# Patient Record
Sex: MALE | Race: WHITE | Employment: OTHER | URBAN - METROPOLITAN AREA
[De-identification: names, ages, dates, MRNs, and addresses within clinical notes are randomized per-mention and may not be internally consistent; named-entity substitution may affect disease eponyms.]

---

## 2017-01-11 ENCOUNTER — TELEPHONE (OUTPATIENT)
Dept: CARDIOTHORACIC SURGERY | Age: 82
End: 2017-01-11

## 2017-01-18 ENCOUNTER — CARE COORDINATION (OUTPATIENT)
Dept: FAMILY MEDICINE CLINIC | Age: 82
End: 2017-01-18

## 2017-01-18 PROBLEM — D69.6 THROMBOCYTOPENIA (HCC): Status: ACTIVE | Noted: 2017-01-18

## 2017-02-03 ENCOUNTER — OFFICE VISIT (OUTPATIENT)
Dept: FAMILY MEDICINE CLINIC | Age: 82
End: 2017-02-03

## 2017-02-03 VITALS
HEART RATE: 55 BPM | OXYGEN SATURATION: 90 % | DIASTOLIC BLOOD PRESSURE: 80 MMHG | WEIGHT: 220 LBS | RESPIRATION RATE: 16 BRPM | BODY MASS INDEX: 35.36 KG/M2 | SYSTOLIC BLOOD PRESSURE: 130 MMHG | HEIGHT: 66 IN

## 2017-02-03 DIAGNOSIS — E11.22 TYPE 2 DIABETES MELLITUS WITH STAGE 3 CHRONIC KIDNEY DISEASE, UNSPECIFIED LONG TERM INSULIN USE STATUS: Primary | ICD-10-CM

## 2017-02-03 DIAGNOSIS — E08.21 DIABETES MELLITUS DUE TO UNDERLYING CONDITION WITH DIABETIC NEPHROPATHY, WITH LONG-TERM CURRENT USE OF INSULIN (HCC): ICD-10-CM

## 2017-02-03 DIAGNOSIS — I73.9 PVD (PERIPHERAL VASCULAR DISEASE) (HCC): ICD-10-CM

## 2017-02-03 DIAGNOSIS — Z79.4 DIABETES MELLITUS DUE TO UNDERLYING CONDITION WITH DIABETIC NEPHROPATHY, WITH LONG-TERM CURRENT USE OF INSULIN (HCC): ICD-10-CM

## 2017-02-03 DIAGNOSIS — I10 ESSENTIAL HYPERTENSION: Chronic | ICD-10-CM

## 2017-02-03 DIAGNOSIS — N18.3 TYPE 2 DIABETES MELLITUS WITH STAGE 3 CHRONIC KIDNEY DISEASE, UNSPECIFIED LONG TERM INSULIN USE STATUS: Primary | ICD-10-CM

## 2017-02-03 LAB — HBA1C MFR BLD: 6.2 %

## 2017-02-03 PROCEDURE — G8419 CALC BMI OUT NRM PARAM NOF/U: HCPCS | Performed by: FAMILY MEDICINE

## 2017-02-03 PROCEDURE — G8427 DOCREV CUR MEDS BY ELIG CLIN: HCPCS | Performed by: FAMILY MEDICINE

## 2017-02-03 PROCEDURE — 1123F ACP DISCUSS/DSCN MKR DOCD: CPT | Performed by: FAMILY MEDICINE

## 2017-02-03 PROCEDURE — G8598 ASA/ANTIPLAT THER USED: HCPCS | Performed by: FAMILY MEDICINE

## 2017-02-03 PROCEDURE — G8484 FLU IMMUNIZE NO ADMIN: HCPCS | Performed by: FAMILY MEDICINE

## 2017-02-03 PROCEDURE — 83036 HEMOGLOBIN GLYCOSYLATED A1C: CPT | Performed by: FAMILY MEDICINE

## 2017-02-03 PROCEDURE — 1036F TOBACCO NON-USER: CPT | Performed by: FAMILY MEDICINE

## 2017-02-03 PROCEDURE — 4040F PNEUMOC VAC/ADMIN/RCVD: CPT | Performed by: FAMILY MEDICINE

## 2017-02-03 PROCEDURE — 99213 OFFICE O/P EST LOW 20 MIN: CPT | Performed by: FAMILY MEDICINE

## 2017-02-17 ENCOUNTER — OFFICE VISIT (OUTPATIENT)
Dept: CARDIOLOGY CLINIC | Age: 82
End: 2017-02-17

## 2017-02-17 VITALS
DIASTOLIC BLOOD PRESSURE: 70 MMHG | SYSTOLIC BLOOD PRESSURE: 126 MMHG | WEIGHT: 219 LBS | HEIGHT: 67 IN | HEART RATE: 60 BPM | BODY MASS INDEX: 34.37 KG/M2

## 2017-02-17 DIAGNOSIS — I10 ESSENTIAL HYPERTENSION: ICD-10-CM

## 2017-02-17 DIAGNOSIS — I35.0 NONRHEUMATIC AORTIC VALVE STENOSIS: ICD-10-CM

## 2017-02-17 DIAGNOSIS — E78.00 HYPERCHOLESTEREMIA: ICD-10-CM

## 2017-02-17 DIAGNOSIS — Z95.2 S/P AVR (AORTIC VALVE REPLACEMENT): Primary | Chronic | ICD-10-CM

## 2017-02-17 DIAGNOSIS — I73.9 PVD (PERIPHERAL VASCULAR DISEASE) (HCC): ICD-10-CM

## 2017-02-17 PROCEDURE — G8484 FLU IMMUNIZE NO ADMIN: HCPCS | Performed by: INTERNAL MEDICINE

## 2017-02-17 PROCEDURE — 1123F ACP DISCUSS/DSCN MKR DOCD: CPT | Performed by: INTERNAL MEDICINE

## 2017-02-17 PROCEDURE — G8598 ASA/ANTIPLAT THER USED: HCPCS | Performed by: INTERNAL MEDICINE

## 2017-02-17 PROCEDURE — G8417 CALC BMI ABV UP PARAM F/U: HCPCS | Performed by: INTERNAL MEDICINE

## 2017-02-17 PROCEDURE — 4040F PNEUMOC VAC/ADMIN/RCVD: CPT | Performed by: INTERNAL MEDICINE

## 2017-02-17 PROCEDURE — 1036F TOBACCO NON-USER: CPT | Performed by: INTERNAL MEDICINE

## 2017-02-17 PROCEDURE — 99214 OFFICE O/P EST MOD 30 MIN: CPT | Performed by: INTERNAL MEDICINE

## 2017-02-17 PROCEDURE — G8427 DOCREV CUR MEDS BY ELIG CLIN: HCPCS | Performed by: INTERNAL MEDICINE

## 2017-04-12 DIAGNOSIS — J44.1 COPD EXACERBATION (HCC): ICD-10-CM

## 2017-04-12 RX ORDER — ALBUTEROL SULFATE 2.5 MG/3ML
2.5 SOLUTION RESPIRATORY (INHALATION) EVERY 6 HOURS PRN
Qty: 120 EACH | Refills: 10 | Status: SHIPPED | OUTPATIENT
Start: 2017-04-12 | End: 2018-01-03

## 2017-04-18 ENCOUNTER — OFFICE VISIT (OUTPATIENT)
Dept: FAMILY MEDICINE CLINIC | Age: 82
End: 2017-04-18

## 2017-04-18 VITALS
DIASTOLIC BLOOD PRESSURE: 70 MMHG | OXYGEN SATURATION: 88 % | HEIGHT: 66 IN | SYSTOLIC BLOOD PRESSURE: 120 MMHG | WEIGHT: 231 LBS | RESPIRATION RATE: 14 BRPM | BODY MASS INDEX: 37.12 KG/M2 | HEART RATE: 71 BPM

## 2017-04-18 DIAGNOSIS — D50.9 IRON DEFICIENCY ANEMIA, UNSPECIFIED IRON DEFICIENCY ANEMIA TYPE: ICD-10-CM

## 2017-04-18 DIAGNOSIS — N18.3 CKD (CHRONIC KIDNEY DISEASE), STAGE 3 (MODERATE): ICD-10-CM

## 2017-04-18 DIAGNOSIS — R18.8 OTHER ASCITES: ICD-10-CM

## 2017-04-18 DIAGNOSIS — R06.02 SOB (SHORTNESS OF BREATH): Primary | ICD-10-CM

## 2017-04-18 DIAGNOSIS — R60.0 PEDAL EDEMA: ICD-10-CM

## 2017-04-18 DIAGNOSIS — R53.1 WEAKNESS: ICD-10-CM

## 2017-04-18 PROCEDURE — G8598 ASA/ANTIPLAT THER USED: HCPCS | Performed by: FAMILY MEDICINE

## 2017-04-18 PROCEDURE — G8417 CALC BMI ABV UP PARAM F/U: HCPCS | Performed by: FAMILY MEDICINE

## 2017-04-18 PROCEDURE — 1123F ACP DISCUSS/DSCN MKR DOCD: CPT | Performed by: FAMILY MEDICINE

## 2017-04-18 PROCEDURE — 4040F PNEUMOC VAC/ADMIN/RCVD: CPT | Performed by: FAMILY MEDICINE

## 2017-04-18 PROCEDURE — 99214 OFFICE O/P EST MOD 30 MIN: CPT | Performed by: FAMILY MEDICINE

## 2017-04-18 PROCEDURE — G8427 DOCREV CUR MEDS BY ELIG CLIN: HCPCS | Performed by: FAMILY MEDICINE

## 2017-04-18 PROCEDURE — 1036F TOBACCO NON-USER: CPT | Performed by: FAMILY MEDICINE

## 2017-04-18 ASSESSMENT — ENCOUNTER SYMPTOMS
CHOKING: 0
SHORTNESS OF BREATH: 1
WHEEZING: 0
ABDOMINAL DISTENTION: 1
CHEST TIGHTNESS: 0
COUGH: 0

## 2017-04-22 ENCOUNTER — CARE COORDINATION (OUTPATIENT)
Dept: CASE MANAGEMENT | Age: 82
End: 2017-04-22

## 2017-04-25 ENCOUNTER — OFFICE VISIT (OUTPATIENT)
Dept: FAMILY MEDICINE CLINIC | Age: 82
End: 2017-04-25

## 2017-04-25 VITALS
WEIGHT: 214.6 LBS | SYSTOLIC BLOOD PRESSURE: 140 MMHG | OXYGEN SATURATION: 93 % | BODY MASS INDEX: 34.49 KG/M2 | DIASTOLIC BLOOD PRESSURE: 70 MMHG | HEIGHT: 66 IN | RESPIRATION RATE: 15 BRPM | HEART RATE: 71 BPM

## 2017-04-25 DIAGNOSIS — N18.30 CKD (CHRONIC KIDNEY DISEASE) STAGE 3, GFR 30-59 ML/MIN (HCC): ICD-10-CM

## 2017-04-25 DIAGNOSIS — I44.1 MOBITZ TYPE 2 SECOND DEGREE ATRIOVENTRICULAR BLOCK: ICD-10-CM

## 2017-04-25 DIAGNOSIS — I44.2 THIRD DEGREE HEART BLOCK (HCC): ICD-10-CM

## 2017-04-25 DIAGNOSIS — N18.4 CHRONIC KIDNEY DISEASE (CKD), STAGE IV (SEVERE) (HCC): ICD-10-CM

## 2017-04-25 DIAGNOSIS — N18.3 TYPE 2 DIABETES MELLITUS WITH STAGE 3 CHRONIC KIDNEY DISEASE, UNSPECIFIED LONG TERM INSULIN USE STATUS: ICD-10-CM

## 2017-04-25 DIAGNOSIS — E11.22 TYPE 2 DIABETES MELLITUS WITH STAGE 3 CHRONIC KIDNEY DISEASE, UNSPECIFIED LONG TERM INSULIN USE STATUS: ICD-10-CM

## 2017-04-25 DIAGNOSIS — I95.9 HYPOTENSION, UNSPECIFIED HYPOTENSION TYPE: ICD-10-CM

## 2017-04-25 DIAGNOSIS — I50.33 ACUTE ON CHRONIC DIASTOLIC HEART FAILURE (HCC): ICD-10-CM

## 2017-04-25 DIAGNOSIS — I50.32 HEART FAILURE, DIASTOLIC, CHRONIC (HCC): ICD-10-CM

## 2017-04-25 DIAGNOSIS — E86.0 DEHYDRATION: ICD-10-CM

## 2017-04-25 DIAGNOSIS — Z09 HOSPITAL DISCHARGE FOLLOW-UP: Primary | ICD-10-CM

## 2017-04-26 ENCOUNTER — TELEPHONE (OUTPATIENT)
Dept: SLEEP MEDICINE | Age: 82
End: 2017-04-26

## 2017-04-26 ENCOUNTER — EPISODE CHANGES (OUTPATIENT)
Dept: CASE MANAGEMENT | Age: 82
End: 2017-04-26

## 2017-04-27 ENCOUNTER — OFFICE VISIT (OUTPATIENT)
Dept: CARDIOLOGY CLINIC | Age: 82
End: 2017-04-27

## 2017-04-27 ENCOUNTER — TELEPHONE (OUTPATIENT)
Dept: OTHER | Age: 82
End: 2017-04-27

## 2017-04-27 ENCOUNTER — TELEPHONE (OUTPATIENT)
Dept: CARDIOLOGY CLINIC | Age: 82
End: 2017-04-27

## 2017-04-27 DIAGNOSIS — Z95.0 PACEMAKER: Primary | ICD-10-CM

## 2017-04-27 PROCEDURE — 1036F TOBACCO NON-USER: CPT | Performed by: NURSE PRACTITIONER

## 2017-04-27 PROCEDURE — 99024 POSTOP FOLLOW-UP VISIT: CPT | Performed by: NURSE PRACTITIONER

## 2017-04-28 ENCOUNTER — HOSPITAL ENCOUNTER (OUTPATIENT)
Dept: NON INVASIVE DIAGNOSTICS | Age: 82
Discharge: HOME | End: 2017-04-28
Attending: INTERNAL MEDICINE | Admitting: INTERNAL MEDICINE

## 2017-04-29 LAB
ALBUMIN SERPL-MCNC: 3.6 G/DL (ref 3.4–5)
ANION GAP SERPL CALCULATED.3IONS-SCNC: 15 MMOL/L (ref 3–16)
BUN BLDV-MCNC: 36 MG/DL (ref 7–20)
CALCIUM SERPL-MCNC: 9.4 MG/DL (ref 8.3–10.6)
CHLORIDE BLD-SCNC: 94 MMOL/L (ref 99–110)
CO2: 28 MMOL/L (ref 21–32)
CREAT SERPL-MCNC: 2 MG/DL (ref 0.8–1.3)
CREATININE URINE: 93.6 MG/DL (ref 39–259)
GFR AFRICAN AMERICAN: 39
GFR NON-AFRICAN AMERICAN: 32
GLUCOSE BLD-MCNC: 107 MG/DL (ref 70–99)
HCT VFR BLD CALC: 28.9 % (ref 40.5–52.5)
HEMOGLOBIN: 9.4 G/DL (ref 13.5–17.5)
MCH RBC QN AUTO: 35.1 PG (ref 26–34)
MCHC RBC AUTO-ENTMCNC: 32.6 G/DL (ref 31–36)
MCV RBC AUTO: 107.8 FL (ref 80–100)
PARATHYROID HORMONE INTACT: 42.5 PG/ML (ref 14–72)
PDW BLD-RTO: 16.6 % (ref 12.4–15.4)
PHOSPHORUS: 3.2 MG/DL (ref 2.5–4.9)
PLATELET # BLD: 85 K/UL (ref 135–450)
PMV BLD AUTO: 9.1 FL (ref 5–10.5)
POTASSIUM SERPL-SCNC: 5.1 MMOL/L (ref 3.5–5.1)
PROTEIN PROTEIN: 28 MG/DL
RBC # BLD: 2.68 M/UL (ref 4.2–5.9)
SODIUM BLD-SCNC: 137 MMOL/L (ref 136–145)
VITAMIN D 25-HYDROXY: 55 NG/ML
WBC # BLD: 5.3 K/UL (ref 4–11)

## 2017-05-03 ENCOUNTER — PROCEDURE VISIT (OUTPATIENT)
Dept: CARDIOLOGY CLINIC | Age: 82
End: 2017-05-03

## 2017-05-03 DIAGNOSIS — Z95.0 PACEMAKER: Primary | ICD-10-CM

## 2017-05-03 DIAGNOSIS — I44.2 COMPLETE AV BLOCK (HCC): ICD-10-CM

## 2017-05-03 PROCEDURE — 93280 PM DEVICE PROGR EVAL DUAL: CPT | Performed by: INTERNAL MEDICINE

## 2017-05-04 ENCOUNTER — OFFICE VISIT (OUTPATIENT)
Dept: FAMILY MEDICINE CLINIC | Age: 82
End: 2017-05-04

## 2017-05-04 VITALS
SYSTOLIC BLOOD PRESSURE: 128 MMHG | BODY MASS INDEX: 34.28 KG/M2 | RESPIRATION RATE: 12 BRPM | OXYGEN SATURATION: 82 % | HEART RATE: 72 BPM | WEIGHT: 212.4 LBS | DIASTOLIC BLOOD PRESSURE: 76 MMHG

## 2017-05-04 DIAGNOSIS — E11.22 TYPE 2 DIABETES MELLITUS WITH STAGE 3 CHRONIC KIDNEY DISEASE, UNSPECIFIED LONG TERM INSULIN USE STATUS: Primary | ICD-10-CM

## 2017-05-04 DIAGNOSIS — I10 ESSENTIAL HYPERTENSION: Chronic | ICD-10-CM

## 2017-05-04 DIAGNOSIS — R09.02 HYPOXIA: ICD-10-CM

## 2017-05-04 DIAGNOSIS — I50.42 HEART FAILURE, SYSTOLIC AND DIASTOLIC, CHRONIC (HCC): ICD-10-CM

## 2017-05-04 DIAGNOSIS — N18.3 TYPE 2 DIABETES MELLITUS WITH STAGE 3 CHRONIC KIDNEY DISEASE, UNSPECIFIED LONG TERM INSULIN USE STATUS: Primary | ICD-10-CM

## 2017-05-04 DIAGNOSIS — I44.2 COMPLETE AV BLOCK (HCC): ICD-10-CM

## 2017-05-04 LAB — HBA1C MFR BLD: 5.5 %

## 2017-05-04 PROCEDURE — 1036F TOBACCO NON-USER: CPT | Performed by: FAMILY MEDICINE

## 2017-05-04 PROCEDURE — G8598 ASA/ANTIPLAT THER USED: HCPCS | Performed by: FAMILY MEDICINE

## 2017-05-04 PROCEDURE — G8417 CALC BMI ABV UP PARAM F/U: HCPCS | Performed by: FAMILY MEDICINE

## 2017-05-04 PROCEDURE — 4040F PNEUMOC VAC/ADMIN/RCVD: CPT | Performed by: FAMILY MEDICINE

## 2017-05-04 PROCEDURE — 83036 HEMOGLOBIN GLYCOSYLATED A1C: CPT | Performed by: FAMILY MEDICINE

## 2017-05-04 PROCEDURE — 1123F ACP DISCUSS/DSCN MKR DOCD: CPT | Performed by: FAMILY MEDICINE

## 2017-05-04 PROCEDURE — 1111F DSCHRG MED/CURRENT MED MERGE: CPT | Performed by: FAMILY MEDICINE

## 2017-05-04 PROCEDURE — G8427 DOCREV CUR MEDS BY ELIG CLIN: HCPCS | Performed by: FAMILY MEDICINE

## 2017-05-04 PROCEDURE — 99214 OFFICE O/P EST MOD 30 MIN: CPT | Performed by: FAMILY MEDICINE

## 2017-05-08 ENCOUNTER — OFFICE VISIT (OUTPATIENT)
Dept: CARDIOLOGY CLINIC | Age: 82
End: 2017-05-08

## 2017-05-08 DIAGNOSIS — Z95.2 S/P AVR (AORTIC VALVE REPLACEMENT): Chronic | ICD-10-CM

## 2017-05-08 DIAGNOSIS — Z95.0 PACEMAKER: ICD-10-CM

## 2017-05-08 DIAGNOSIS — I10 ESSENTIAL HYPERTENSION: Chronic | ICD-10-CM

## 2017-05-08 DIAGNOSIS — I44.2 COMPLETE AV BLOCK (HCC): Primary | ICD-10-CM

## 2017-05-08 PROCEDURE — 99024 POSTOP FOLLOW-UP VISIT: CPT | Performed by: NURSE PRACTITIONER

## 2017-05-08 PROCEDURE — 1036F TOBACCO NON-USER: CPT | Performed by: NURSE PRACTITIONER

## 2017-05-08 RX ORDER — DOXYCYCLINE HYCLATE 100 MG/1
100 CAPSULE ORAL 2 TIMES DAILY
Qty: 20 CAPSULE | Refills: 0 | Status: SHIPPED | OUTPATIENT
Start: 2017-05-08 | End: 2018-01-11 | Stop reason: SDUPTHER

## 2017-05-20 PROCEDURE — 99496 TRANSJ CARE MGMT HIGH F2F 7D: CPT | Performed by: FAMILY MEDICINE

## 2017-07-13 ENCOUNTER — CARE COORDINATION (OUTPATIENT)
Dept: CARE COORDINATION | Age: 82
End: 2017-07-13

## 2017-07-26 PROBLEM — C90.00 MULTIPLE MYELOMA NOT HAVING ACHIEVED REMISSION (HCC): Status: ACTIVE | Noted: 2017-07-26

## 2017-08-08 ENCOUNTER — OFFICE VISIT (OUTPATIENT)
Dept: CARDIOLOGY CLINIC | Age: 82
End: 2017-08-08

## 2017-08-08 ENCOUNTER — PROCEDURE VISIT (OUTPATIENT)
Dept: CARDIOLOGY CLINIC | Age: 82
End: 2017-08-08

## 2017-08-08 VITALS
SYSTOLIC BLOOD PRESSURE: 124 MMHG | HEIGHT: 66 IN | BODY MASS INDEX: 33.43 KG/M2 | DIASTOLIC BLOOD PRESSURE: 60 MMHG | HEART RATE: 86 BPM | WEIGHT: 208 LBS

## 2017-08-08 DIAGNOSIS — Z95.0 PACEMAKER: ICD-10-CM

## 2017-08-08 DIAGNOSIS — Z95.2 S/P AVR (AORTIC VALVE REPLACEMENT): Chronic | ICD-10-CM

## 2017-08-08 DIAGNOSIS — I25.10 CORONARY ARTERY DISEASE INVOLVING NATIVE CORONARY ARTERY OF NATIVE HEART WITHOUT ANGINA PECTORIS: Primary | Chronic | ICD-10-CM

## 2017-08-08 DIAGNOSIS — I10 ESSENTIAL HYPERTENSION: Chronic | ICD-10-CM

## 2017-08-08 DIAGNOSIS — I44.1 MOBITZ TYPE 2 SECOND DEGREE ATRIOVENTRICULAR BLOCK: ICD-10-CM

## 2017-08-08 DIAGNOSIS — R00.1 SYMPTOMATIC BRADYCARDIA: ICD-10-CM

## 2017-08-08 DIAGNOSIS — I44.1 MOBITZ (TYPE) I (WENCKEBACH'S) ATRIOVENTRICULAR BLOCK: ICD-10-CM

## 2017-08-08 PROCEDURE — 1036F TOBACCO NON-USER: CPT | Performed by: INTERNAL MEDICINE

## 2017-08-08 PROCEDURE — 1123F ACP DISCUSS/DSCN MKR DOCD: CPT | Performed by: INTERNAL MEDICINE

## 2017-08-08 PROCEDURE — G8598 ASA/ANTIPLAT THER USED: HCPCS | Performed by: INTERNAL MEDICINE

## 2017-08-08 PROCEDURE — G8427 DOCREV CUR MEDS BY ELIG CLIN: HCPCS | Performed by: INTERNAL MEDICINE

## 2017-08-08 PROCEDURE — 4040F PNEUMOC VAC/ADMIN/RCVD: CPT | Performed by: INTERNAL MEDICINE

## 2017-08-08 PROCEDURE — G8417 CALC BMI ABV UP PARAM F/U: HCPCS | Performed by: INTERNAL MEDICINE

## 2017-08-08 PROCEDURE — 99214 OFFICE O/P EST MOD 30 MIN: CPT | Performed by: INTERNAL MEDICINE

## 2017-08-08 PROCEDURE — 93280 PM DEVICE PROGR EVAL DUAL: CPT | Performed by: INTERNAL MEDICINE

## 2017-08-10 ENCOUNTER — OFFICE VISIT (OUTPATIENT)
Dept: FAMILY MEDICINE CLINIC | Age: 82
End: 2017-08-10

## 2017-08-10 VITALS
SYSTOLIC BLOOD PRESSURE: 116 MMHG | BODY MASS INDEX: 33.31 KG/M2 | OXYGEN SATURATION: 91 % | HEART RATE: 70 BPM | WEIGHT: 206.4 LBS | DIASTOLIC BLOOD PRESSURE: 60 MMHG

## 2017-08-10 DIAGNOSIS — N18.30 TYPE 2 DIABETES MELLITUS WITH STAGE 3 CHRONIC KIDNEY DISEASE, WITHOUT LONG-TERM CURRENT USE OF INSULIN (HCC): ICD-10-CM

## 2017-08-10 DIAGNOSIS — E11.22 TYPE 2 DIABETES MELLITUS WITH STAGE 3 CHRONIC KIDNEY DISEASE, WITHOUT LONG-TERM CURRENT USE OF INSULIN (HCC): ICD-10-CM

## 2017-08-10 DIAGNOSIS — I10 ESSENTIAL HYPERTENSION: Primary | Chronic | ICD-10-CM

## 2017-08-10 LAB — HBA1C MFR BLD: 5.9 %

## 2017-08-10 PROCEDURE — G8598 ASA/ANTIPLAT THER USED: HCPCS | Performed by: FAMILY MEDICINE

## 2017-08-10 PROCEDURE — 83036 HEMOGLOBIN GLYCOSYLATED A1C: CPT | Performed by: FAMILY MEDICINE

## 2017-08-10 PROCEDURE — G8417 CALC BMI ABV UP PARAM F/U: HCPCS | Performed by: FAMILY MEDICINE

## 2017-08-10 PROCEDURE — 99213 OFFICE O/P EST LOW 20 MIN: CPT | Performed by: FAMILY MEDICINE

## 2017-08-10 PROCEDURE — G8427 DOCREV CUR MEDS BY ELIG CLIN: HCPCS | Performed by: FAMILY MEDICINE

## 2017-08-10 PROCEDURE — 4040F PNEUMOC VAC/ADMIN/RCVD: CPT | Performed by: FAMILY MEDICINE

## 2017-08-10 PROCEDURE — 1036F TOBACCO NON-USER: CPT | Performed by: FAMILY MEDICINE

## 2017-08-10 PROCEDURE — 1123F ACP DISCUSS/DSCN MKR DOCD: CPT | Performed by: FAMILY MEDICINE

## 2017-08-10 ASSESSMENT — ENCOUNTER SYMPTOMS
GASTROINTESTINAL NEGATIVE: 1
SHORTNESS OF BREATH: 1
COUGH: 1

## 2017-08-16 DIAGNOSIS — F41.9 ANXIETY: ICD-10-CM

## 2017-08-16 DIAGNOSIS — H10.13 ALLERGIC CONJUNCTIVITIS, BILATERAL: ICD-10-CM

## 2017-08-16 RX ORDER — ALPRAZOLAM 1 MG/1
TABLET ORAL
Qty: 90 TABLET | Refills: 0 | Status: SHIPPED | OUTPATIENT
Start: 2017-08-16 | End: 2017-10-14 | Stop reason: SDUPTHER

## 2017-08-16 RX ORDER — OLOPATADINE HYDROCHLORIDE 1 MG/ML
SOLUTION/ DROPS OPHTHALMIC
Qty: 3 BOTTLE | Refills: 3 | Status: SHIPPED | OUTPATIENT
Start: 2017-08-16

## 2017-08-29 ENCOUNTER — PRE-PROCEDURE TELEPHONE (OUTPATIENT)
Dept: INTERVENTIONAL RADIOLOGY/VASCULAR | Age: 82
End: 2017-08-29

## 2017-08-31 ENCOUNTER — HOSPITAL ENCOUNTER (OUTPATIENT)
Dept: INTERVENTIONAL RADIOLOGY/VASCULAR | Age: 82
Discharge: HOME | End: 2017-08-31
Attending: INTERNAL MEDICINE | Admitting: INTERNAL MEDICINE

## 2017-08-31 VITALS
TEMPERATURE: 97.1 F | WEIGHT: 202 LBS | HEIGHT: 66 IN | DIASTOLIC BLOOD PRESSURE: 73 MMHG | BODY MASS INDEX: 32.47 KG/M2 | RESPIRATION RATE: 16 BRPM | OXYGEN SATURATION: 97 % | SYSTOLIC BLOOD PRESSURE: 138 MMHG | HEART RATE: 73 BPM

## 2017-08-31 DIAGNOSIS — C90.00 MULTIPLE MYELOMA, REMISSION STATUS UNSPECIFIED (HCC): ICD-10-CM

## 2017-08-31 DIAGNOSIS — C34.92 SMALL CELL LUNG CARCINOMA, LEFT (HCC): ICD-10-CM

## 2017-08-31 RX ORDER — MIDAZOLAM HYDROCHLORIDE 1 MG/ML
INJECTION INTRAMUSCULAR; INTRAVENOUS
Status: COMPLETED | OUTPATIENT
Start: 2017-08-31 | End: 2017-08-31

## 2017-08-31 RX ORDER — FENTANYL CITRATE 50 UG/ML
INJECTION, SOLUTION INTRAMUSCULAR; INTRAVENOUS
Status: COMPLETED | OUTPATIENT
Start: 2017-08-31 | End: 2017-08-31

## 2017-08-31 RX ORDER — CLINDAMYCIN PHOSPHATE 600 MG/50ML
600 INJECTION INTRAVENOUS ONCE
Status: COMPLETED | OUTPATIENT
Start: 2017-08-31 | End: 2017-08-31

## 2017-08-31 RX ADMIN — MIDAZOLAM HYDROCHLORIDE 1 MG: 1 INJECTION INTRAMUSCULAR; INTRAVENOUS at 11:41

## 2017-08-31 RX ADMIN — FENTANYL CITRATE 50 MCG: 50 INJECTION, SOLUTION INTRAMUSCULAR; INTRAVENOUS at 11:56

## 2017-08-31 RX ADMIN — FENTANYL CITRATE 50 MCG: 50 INJECTION, SOLUTION INTRAMUSCULAR; INTRAVENOUS at 11:46

## 2017-08-31 RX ADMIN — FENTANYL CITRATE 50 MCG: 50 INJECTION, SOLUTION INTRAMUSCULAR; INTRAVENOUS at 11:41

## 2017-08-31 RX ADMIN — MIDAZOLAM HYDROCHLORIDE 1 MG: 1 INJECTION INTRAMUSCULAR; INTRAVENOUS at 11:46

## 2017-08-31 RX ADMIN — CLINDAMYCIN PHOSPHATE 600 MG: 600 INJECTION INTRAVENOUS at 10:59

## 2017-08-31 RX ADMIN — MIDAZOLAM HYDROCHLORIDE 1 MG: 1 INJECTION INTRAMUSCULAR; INTRAVENOUS at 11:57

## 2017-08-31 ASSESSMENT — PAIN SCALES - GENERAL: PAINLEVEL_OUTOF10: 0

## 2017-08-31 ASSESSMENT — PAIN - FUNCTIONAL ASSESSMENT: PAIN_FUNCTIONAL_ASSESSMENT: 0-10

## 2017-09-01 ENCOUNTER — POST-OP TELEPHONE (OUTPATIENT)
Dept: INTERVENTIONAL RADIOLOGY/VASCULAR | Age: 82
End: 2017-09-01

## 2017-10-14 DIAGNOSIS — F41.9 ANXIETY: ICD-10-CM

## 2017-10-16 RX ORDER — ALPRAZOLAM 1 MG/1
TABLET ORAL
Qty: 90 TABLET | Refills: 0 | Status: SHIPPED | OUTPATIENT
Start: 2017-10-16 | End: 2017-12-14 | Stop reason: SDUPTHER

## 2017-10-16 NOTE — TELEPHONE ENCOUNTER
Last OV: 08/10/2017  Last refill xanax: 08/16/2017, #90, 0 refill  Last refill Albuterol: 04/12/2017, #120, 10 refills.

## 2017-11-09 ENCOUNTER — TELEPHONE (OUTPATIENT)
Dept: FAMILY MEDICINE CLINIC | Age: 82
End: 2017-11-09

## 2017-11-09 ENCOUNTER — OFFICE VISIT (OUTPATIENT)
Dept: FAMILY MEDICINE CLINIC | Age: 82
End: 2017-11-09

## 2017-11-09 VITALS
WEIGHT: 207 LBS | HEART RATE: 74 BPM | DIASTOLIC BLOOD PRESSURE: 58 MMHG | BODY MASS INDEX: 33.41 KG/M2 | OXYGEN SATURATION: 95 % | SYSTOLIC BLOOD PRESSURE: 116 MMHG

## 2017-11-09 DIAGNOSIS — E11.22 TYPE 2 DIABETES MELLITUS WITH STAGE 3 CHRONIC KIDNEY DISEASE, WITHOUT LONG-TERM CURRENT USE OF INSULIN (HCC): Primary | ICD-10-CM

## 2017-11-09 DIAGNOSIS — R06.02 SOB (SHORTNESS OF BREATH): ICD-10-CM

## 2017-11-09 DIAGNOSIS — I10 ESSENTIAL HYPERTENSION: Chronic | ICD-10-CM

## 2017-11-09 DIAGNOSIS — N18.30 TYPE 2 DIABETES MELLITUS WITH STAGE 3 CHRONIC KIDNEY DISEASE, WITHOUT LONG-TERM CURRENT USE OF INSULIN (HCC): Primary | ICD-10-CM

## 2017-11-09 LAB — HBA1C MFR BLD: 7.3 %

## 2017-11-09 PROCEDURE — 1123F ACP DISCUSS/DSCN MKR DOCD: CPT | Performed by: FAMILY MEDICINE

## 2017-11-09 PROCEDURE — 83036 HEMOGLOBIN GLYCOSYLATED A1C: CPT | Performed by: FAMILY MEDICINE

## 2017-11-09 PROCEDURE — G8427 DOCREV CUR MEDS BY ELIG CLIN: HCPCS | Performed by: FAMILY MEDICINE

## 2017-11-09 PROCEDURE — G8417 CALC BMI ABV UP PARAM F/U: HCPCS | Performed by: FAMILY MEDICINE

## 2017-11-09 PROCEDURE — 4040F PNEUMOC VAC/ADMIN/RCVD: CPT | Performed by: FAMILY MEDICINE

## 2017-11-09 PROCEDURE — G8484 FLU IMMUNIZE NO ADMIN: HCPCS | Performed by: FAMILY MEDICINE

## 2017-11-09 PROCEDURE — 99213 OFFICE O/P EST LOW 20 MIN: CPT | Performed by: FAMILY MEDICINE

## 2017-11-09 PROCEDURE — G8598 ASA/ANTIPLAT THER USED: HCPCS | Performed by: FAMILY MEDICINE

## 2017-11-09 PROCEDURE — 1036F TOBACCO NON-USER: CPT | Performed by: FAMILY MEDICINE

## 2017-11-09 NOTE — PROGRESS NOTES
SUBJECTIVE:  80 y.o. male for follow up of diabetes. medication compliance:  compliant most of the time, diabetic diet compliance:  compliant most of the time, home glucose monitoring: are performed regularly  Exercise:no formal exercise  Other symptoms and concerns: Restarted Chemo 10 weeks ago and has been worn out. Hypertension: Patient here for follow-up of elevated blood pressure. Blood pressure is within normal limits  at home. Patient denies chest pain. He denies side effects from medication. SOB: SOB with any exertion. Uses ProAir BID and occasional nebulizer. On 24 hr O2.  Sees Cardiologist. Has not required any Pulmonary hospitalization    Outpatient Prescriptions Marked as Taking for the 11/9/17 encounter (Office Visit) with Aria Dee MD   Medication Sig Dispense Refill    METHYLPREDNISOLONE SODIUM SUCC IJ Inject 60 mg as directed      PROAIR  (90 Base) MCG/ACT inhaler INHALE 2 PUFFS BY MOUTH EVERY 4 HOURS AS NEEDED FOR WHEEZING 8.5 g 11    ALPRAZolam (XANAX) 1 MG tablet TAKE 1 TABLET BY MOUTH THREE TIMES DAILY AS NEEDED 90 tablet 0    olopatadine (PATANOL) 0.1 % ophthalmic solution INSTILL 1 DROP IN BOTH EYES TWICE DAILY 3 Bottle 3    dexamethasone (DECADRON) 4 MG tablet Take 1 tablet by mouth daily for 2 days after treatment 8 tablet 6    valACYclovir (VALTREX) 500 MG tablet Take 1 tablet by mouth 2 times daily Starting 1 week after first infusion 60 tablet 6    albuterol (PROVENTIL) (2.5 MG/3ML) 0.083% nebulizer solution Take 3 mLs by nebulization every 6 hours as needed for Wheezing 120 each 10    promethazine (PHENERGAN) 25 MG tablet Take 0.5-1 tablets by mouth every 6 hours as needed for Nausea 30 tablet 0    ONE TOUCH ULTRASOFT LANCETS MISC USE AS DIRECTED 200 each 5    glucose blood VI test strips (ONE TOUCH ULTRA TEST) strip TEST AS DIRECTED TWICE DAILY 200 strip 3    oxyCODONE 5 MG capsule Take 1 capsule by mouth every 4 hours as needed for Pain 60 capsule 0    polyethylene glycol (GLYCOLAX) powder Take 17 g by mouth daily as needed       guaiFENesin 400 MG tablet Take 400 mg by mouth 2 times daily      lidocaine (LIDODERM) 5 % Place 1 patch onto the skin for 12 hours and off for 12 hours. 30 patch 12    fluticasone (FLONASE) 50 MCG/ACT nasal spray 2 sprays by Nasal route daily 1 Bottle 3    docusate sodium (COLACE) 100 MG capsule Take 100 mg by mouth as needed       Elastic Bandages & Supports (V-2 HIGH COMPRESSION HOSE) MISC 10-20 mm Compression Hose 1 each 2    OXYGEN Inhale into the lungs 2 liters all the time      epoetin german (PROCRIT) 43245 UNIT/ML injection Inject 60,000 Units into the skin. Every two weeks as directed      Calcium Carbonate-Vitamin D (CALCIUM 600-D PO) Take 600 mg by mouth daily.  Multiple Vitamin (MULTIVITAMIN PO) Take 1 tablet by mouth daily.  Fiber TABS Take 1 capsule by mouth daily.  ranitidine (ZANTAC) 150 MG tablet Take 150 mg by mouth 2 times daily           Lab Results   Component Value Date    LABA1C 7.3 11/9/2017       OBJECTIVE:   BP (!) 116/58 (Site: Left Arm, Position: Sitting, Cuff Size: Large Adult)   Pulse 74   Wt 207 lb (93.9 kg)   SpO2 95%   BMI 33.41 kg/m²    Appearance alert, well appearing, and in no distress. Neck: No JVD, or bruits  Lungs: Chest is clear; no wheezes or rales. Heart: S1 and S2 normal, no clicks, gallops or rubs. Regular rate and rhythm. Grade 1-2 systolic murmur   Ext: No edema. Diabetic foot check per nurses note. Lab review: HbA1c as above. Assessment/Plan:    Cheryl Zamorano was seen today for hypertension, diabetes and copd.     Diagnoses and all orders for this visit:    Type 2 diabetes mellitus with stage 3 chronic kidney disease, without long-term current use of insulin (HCC)  -     POCT glycosylated hemoglobin (Hb A1C)  HbA1c needs better control for the first time in a long time  He preferred not to start meds since he feels this is all due to steroids fron

## 2017-11-09 NOTE — PATIENT INSTRUCTIONS
Patient Education        Type 2 Diabetes: Care Instructions  Your Care Instructions  Type 2 diabetes is a disease that develops when the body's tissues cannot use insulin properly. Over time, the pancreas cannot make enough insulin. Insulin is a hormone that helps the body's cells use sugar (glucose) for energy. It also helps the body store extra sugar in muscle, fat, and liver cells. Without insulin, the sugar cannot get into the cells to do its work. It stays in the blood instead. This can cause high blood sugar levels. A person has diabetes when the blood sugar stays too high too much of the time. Over time, diabetes can lead to diseases of the heart, blood vessels, nerves, kidneys, and eyes. You may be able to control your blood sugar by losing weight, eating a healthy diet, and getting daily exercise. You may also have to take insulin or other diabetes medicine. Follow-up care is a key part of your treatment and safety. Be sure to make and go to all appointments. Call your doctor if you are having problems. It's also a good idea to know your test results and keep a list of the medicines you take. How can you care for yourself at home? · Keep your blood sugar at a target level (which you set with your doctor). ¨ Eat a good diet that spreads carbohydrate throughout the day. Carbohydratethe body's main source of fuelaffects blood sugar more than any other nutrient. Carbohydrate is in fruits, vegetables, milk, and yogurt. It also is in breads, cereals, vegetables such as potatoes and corn, and sugary foods such as candy and cakes. ¨ Aim for 30 minutes of exercise on most, preferably all, days of the week. Walking is a good choice. You also may want to do other activities, such as running, swimming, cycling, or playing tennis or team sports. If your doctor says it's okay, do muscle-strengthening exercises at least 2 times a week. ¨ Take your medicines exactly as prescribed.  Call your doctor if you think Pro V&V account. Enter C553 in the [facility] box to learn more about \"Type 2 Diabetes: Care Instructions. \"     If you do not have an account, please click on the \"Sign Up Now\" link. Current as of: March 13, 2017  Content Version: 11.3  © 4888-0507 [employer]. Care instructions adapted under license by [facility] ([facility]). If you have questions about a medical condition or this instruction, always ask your healthcare professional. [address] any warranty or liability for your use of this information. Patient Education        Learning About High Blood Pressure  What is high blood pressure? Blood pressure is a measure of how hard the blood pushes against the walls of your arteries. It's normal for blood pressure to go up and down throughout the day, but if it stays up, you have high blood pressure. Another name for high blood pressure is hypertension. Two numbers tell you your blood pressure. The first number is the systolic pressure. It shows how hard the blood pushes when your heart is pumping. The second number is the diastolic pressure. It shows how hard the blood pushes between heartbeats, when your heart is relaxed and filling with blood. A blood pressure of less than 120/80 (say \"120 over 80\") is ideal for an adult. High blood pressure is 140/90 or higher. You have high blood pressure if your top number is 140 or higher or your bottom number is 90 or higher, or both. Many people fall into the category in between, called prehypertension. People with prehypertension need to make lifestyle changes to bring their blood pressure down and help prevent or delay high blood pressure. What happens when you have high blood pressure? · Blood flows through your arteries with too much force. Over time, this damages the walls of your arteries. But you can't feel it. High blood pressure usually doesn't cause symptoms.   · Fat and calcium start to build up in your do not have an account, please click on the \"Sign Up Now\" link. Current as of: March 25, 2017  Content Version: 11.3  © 7103-7472 [employer]. Care instructions adapted under license by [facility] ([facility]). If you have questions about a medical condition or this instruction, always ask your healthcare professional. [address] any warranty or liability for your use of this information.

## 2017-11-13 PROCEDURE — 93294 REM INTERROG EVL PM/LDLS PM: CPT | Performed by: INTERNAL MEDICINE

## 2017-11-13 PROCEDURE — 93296 REM INTERROG EVL PM/IDS: CPT | Performed by: INTERNAL MEDICINE

## 2017-11-14 DIAGNOSIS — E11.22 TYPE 2 DIABETES MELLITUS WITH STAGE 3 CHRONIC KIDNEY DISEASE, UNSPECIFIED LONG TERM INSULIN USE STATUS: ICD-10-CM

## 2017-11-14 DIAGNOSIS — N18.3 TYPE 2 DIABETES MELLITUS WITH STAGE 3 CHRONIC KIDNEY DISEASE, UNSPECIFIED LONG TERM INSULIN USE STATUS: ICD-10-CM

## 2017-11-14 RX ORDER — LANCETS
EACH MISCELLANEOUS
Qty: 200 EACH | Refills: 3 | Status: ON HOLD | OUTPATIENT
Start: 2017-11-14 | End: 2018-09-24 | Stop reason: HOSPADM

## 2017-11-14 NOTE — TELEPHONE ENCOUNTER
Last OV  11/9/2017 - type 2 diabetes   Test strips  Last Refill 08/08/2016 #200 3 refills     Lancets  Last refill 08/18/2016 #200 5 refills     Lab Results   Component Value Date    LABA1C 7.3 11/09/2017     Lab Results   Component Value Date    .4 04/18/2017

## 2017-11-14 NOTE — TELEPHONE ENCOUNTER
Called [address] and asked them to fax the CMN form to us. They will fax it to the attn of Dr. Alana Rios with patient's name. Please keep an eye out for this form. Thanks.

## 2017-11-15 ENCOUNTER — NURSE ONLY (OUTPATIENT)
Dept: CARDIOLOGY CLINIC | Age: 82
End: 2017-11-15

## 2017-11-15 DIAGNOSIS — I44.2 COMPLETE AV BLOCK (HCC): ICD-10-CM

## 2017-11-15 DIAGNOSIS — Z95.0 PACEMAKER: Primary | ICD-10-CM

## 2017-11-15 NOTE — LETTER
[id number] [facility] [phone]  Marilee Erika Keegan Riggs [address] [facility] [phone]    Pacemaker/Defibrillator Clinic          11/14/17        [address] [place] [address] Naheed Donovan [place] [address]        Dear Mei Hanna    This letter is to inform you that we received the transmission from your monitor at home that checks your pacemaker and/or defibrillator, or implanted heart monitor. Everything is within normal limits. The next date you should transmit will be 2-27-18. Please do not send additional routine transmissions unless specifically requested. Please be aware that the remote device transmission sites are periodically monitored only during regular business hours during which simultaneous in-office device clinics are being run. If your transmission requires attention, we will contact you as soon as possible. Thank you.             [address]

## 2017-12-14 DIAGNOSIS — F41.9 ANXIETY: ICD-10-CM

## 2017-12-14 RX ORDER — ALPRAZOLAM 1 MG/1
TABLET ORAL
Qty: 90 TABLET | Refills: 0 | Status: SHIPPED | OUTPATIENT
Start: 2017-12-14 | End: 2018-03-14 | Stop reason: SDUPTHER

## 2017-12-15 ENCOUNTER — TELEPHONE (OUTPATIENT)
Dept: VASCULAR SURGERY | Age: 82
End: 2017-12-15

## 2017-12-15 DIAGNOSIS — I65.23 BILATERAL CAROTID ARTERY STENOSIS: Primary | ICD-10-CM

## 2017-12-15 DIAGNOSIS — I71.40 ABDOMINAL AORTIC ANEURYSM WITHOUT RUPTURE: ICD-10-CM

## 2017-12-15 DIAGNOSIS — I71.40 ABDOMINAL AORTIC ANEURYSM WITHOUT RUPTURE: Primary | ICD-10-CM

## 2017-12-18 ENCOUNTER — HOSPITAL ENCOUNTER (OUTPATIENT)
Dept: OTHER | Age: 82
Discharge: HOME | End: 2017-12-18
Attending: SURGERY | Admitting: SURGERY

## 2017-12-18 DIAGNOSIS — I71.40 ABDOMINAL AORTIC ANEURYSM WITHOUT RUPTURE: ICD-10-CM

## 2017-12-18 DIAGNOSIS — I65.29 STENOSIS OF CAROTID ARTERY, UNSPECIFIED LATERALITY: Primary | ICD-10-CM

## 2017-12-18 LAB
BUN BLDV-MCNC: 34 MG/DL (ref 7–20)
CREAT SERPL-MCNC: 1.7 MG/DL (ref 0.8–1.3)
GFR AFRICAN AMERICAN: 47
GFR NON-AFRICAN AMERICAN: 39

## 2017-12-18 RX ORDER — IODIXANOL 320 MG/ML
50 INJECTION, SOLUTION INTRAVASCULAR
Status: COMPLETED | OUTPATIENT
Start: 2017-12-18 | End: 2017-12-18

## 2017-12-18 RX ADMIN — IODIXANOL 50 ML: 320 INJECTION, SOLUTION INTRAVASCULAR at 12:48

## 2017-12-22 ENCOUNTER — OFFICE VISIT (OUTPATIENT)
Dept: VASCULAR SURGERY | Age: 82
End: 2017-12-22

## 2017-12-22 VITALS
DIASTOLIC BLOOD PRESSURE: 68 MMHG | WEIGHT: 206 LBS | BODY MASS INDEX: 33.11 KG/M2 | HEIGHT: 66 IN | SYSTOLIC BLOOD PRESSURE: 128 MMHG

## 2017-12-22 DIAGNOSIS — I71.40 AAA (ABDOMINAL AORTIC ANEURYSM) WITHOUT RUPTURE: ICD-10-CM

## 2017-12-22 DIAGNOSIS — I65.23 CAROTID ATHEROSCLEROSIS, BILATERAL: Primary | ICD-10-CM

## 2017-12-22 PROCEDURE — G8427 DOCREV CUR MEDS BY ELIG CLIN: HCPCS | Performed by: SURGERY

## 2017-12-22 PROCEDURE — G8484 FLU IMMUNIZE NO ADMIN: HCPCS | Performed by: SURGERY

## 2017-12-22 PROCEDURE — 4040F PNEUMOC VAC/ADMIN/RCVD: CPT | Performed by: SURGERY

## 2017-12-22 PROCEDURE — 1036F TOBACCO NON-USER: CPT | Performed by: SURGERY

## 2017-12-22 PROCEDURE — 99213 OFFICE O/P EST LOW 20 MIN: CPT | Performed by: SURGERY

## 2017-12-22 PROCEDURE — G8598 ASA/ANTIPLAT THER USED: HCPCS | Performed by: SURGERY

## 2017-12-22 PROCEDURE — 1123F ACP DISCUSS/DSCN MKR DOCD: CPT | Performed by: SURGERY

## 2017-12-22 PROCEDURE — G8417 CALC BMI ABV UP PARAM F/U: HCPCS | Performed by: SURGERY

## 2017-12-22 NOTE — PROGRESS NOTES
[facility])   Vascular Surgery Followup    Referring Provider:  Mervat Chavez MD     Chief Complaint   Patient presents with    Follow-up        History of Present Illness:   71-year-old male with history of congestive heart failure, chronic kidney disease, hypertension, hyperlipidemia, tobacco abuse, diabetes and previous endovascular abdominal aortic aneurysm repair in 2010. Here today for routine follow-up. He is doing well. He does complain of fatigue and he feels this is related to his current chemo regimen. Denies TIA, stroke or amaurosis. Denies abdominal or back pain. Past Medical History:   has a past medical history of Anemia; Anxiety; Arrhythmia; Arthritis; Blood transfusion; CAD (coronary artery disease); Cancer [facility]); CHF (congestive heart failure) ([facility] [address] Chronic back pain; CKD (chronic kidney disease) stage 3, GFR 30-59 ml/min; Diabetes mellitus ([facility] [address] Former cigarette smoker; Hyperlipemia; Hypertension; Multiple myeloma ([facility] [address] and Renal insufficiency. Surgical History:   has a past surgical history that includes joint replacement; back surgery (1966); Carpal tunnel release; tendon manipulation; sinus surgery; Uvulopalatopharygoplasty; Skin cancer excision; Aortic valve replacement (10/2011); Colonoscopy (11/19/12); Abdominal aortic aneurysm repair; Upper gastrointestinal endoscopy (3/9/16); ERCP (4/5/2016); Cardiac surgery; Upper gastrointestinal endoscopy (04/12/2016); and Cardiac pacemaker placement (04/2017). Social History:   reports that he quit smoking about 39 years ago. He has a 90.00 pack-year smoking history. He has never used smokeless tobacco. He reports that he does not drink alcohol or use drugs. Family History:  family history includes Cancer in his brother and father; Diabetes in his father and mother; Heart Disease in his maternal grandmother and mother; High Blood Pressure in his mother; Stroke in his sister.      Home Medications:  Current Outpatient trachea midline and thyroid: not enlarged, symmetric, no tenderness/mass/nodules  Lungs: clear to auscultation bilaterally  Heart: regular rate and rhythm  Abdomen: soft, non-tender. Bowel sounds normal. No masses,  no organomegaly  Extremities: extremities normal, atraumatic, no cyanosis or edema    Pulses:   L brachial 2 R brachial 2   L radial 2 R radial 2   L femoral 2 R femoral 2   L popliteal 1 R popliteal 1   L posterior tibial 1 R posterior tibial 1   L dorsalis pedis 1 R dorsalis pedis 1   Doppler Signals:  +    Neurologic: Grossly normal    MEDICAL DECISION MAKING/TESTING  I have reviewed the testing personally and my interpretation is below. Unchanged appearance of the abdominal aortic aneurysm status post   endovascular repair as detailed above.  No evidence of endoleak. Right Impression   The right internal carotid artery appears to have a <50% diameter reducing   stenosis based on velocity criteria. The right vertebral artery demonstrates normal antegrade flow. The right subclavian artery is visualized and demonstrates multiphasic flow. Left Impression   The left internal carotid artery appears to have a <50% diameter reducing   stenosis based on velocity criteria. The left vertebral artery demonstrates normal antegrade flow.    The left subclavian artery is visualized and demonstrates multiphasic flow.                 Assessment:     Patient Active Problem List   Diagnosis    Arthritis    Anxiety    Abnormal EKG    PVD (peripheral vascular disease) (HCC)    Anemia    S/P AVR (aortic valve replacement)    Multiple myeloma    Coronary artery disease involving native coronary artery of native heart without angina pectoris    SOB (shortness of breath)    CKD (chronic kidney disease) stage 3, GFR 30-59 ml/min    Essential hypertension    Anemia of chronic renal failure, stage 3 (moderate)    Acute pancreatitis    H/O malignant neoplasm of skin    Basal cell papilloma    Malignant neoplasm of skin    Actinic keratosis    Upper GI bleed    Shock circulatory (HCC)    Acute post-hemorrhagic anemia    Pure hypercholesterolemia    Thrombocytopenia (HCC)    Symptomatic bradycardia    Mobitz (type) I (Wenckebach's) atrioventricular block    Morbid obesity (HCC)    Chronic diastolic heart failure (HCC)    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 Carotid atherosclerosis, bilateral  Stable asymptomatic carotid atherosclerosis. No significant progression of his carotid disease. Continue with routine surveillance and repeat carotid duplex scan in 1 year  - VL DUP CAROTID BILATERAL; Future    2. AAA (abdominal aortic aneurysm) without rupture [facility])  Status post endovascular aneurysm repair. No evidence of endoleak. Stable aneurysm sac size. Continue surveillance. Repeat CT in 1  - CTA ABDOMEN PELVIS W WO CONTRAST; Future        Thank you for allowing me to participate in the care of this individual.  Please do not hesitate to contact me with any questions. Lori Ulloa M.D., FACS.  12/22/2017  9:19 AM

## 2018-01-03 ENCOUNTER — TELEPHONE (OUTPATIENT)
Dept: FAMILY MEDICINE CLINIC | Age: 83
End: 2018-01-03

## 2018-01-03 RX ORDER — ALBUTEROL SULFATE 90 UG/1
2 AEROSOL, METERED RESPIRATORY (INHALATION) EVERY 6 HOURS PRN
Qty: 1 INHALER | Refills: 11 | Status: SHIPPED | OUTPATIENT
Start: 2018-01-03 | End: 2018-08-14 | Stop reason: SDUPTHER

## 2018-01-03 NOTE — TELEPHONE ENCOUNTER
Patients daughter called in stating that he will need a change in medication. His insurance no longer covers Pro-Air HFA inhaler, they prefer Ventolin HFA.      He will need refills around the 15th and is aware that  is out of the office

## 2018-01-11 ENCOUNTER — OFFICE VISIT (OUTPATIENT)
Dept: FAMILY MEDICINE CLINIC | Age: 83
End: 2018-01-11

## 2018-01-11 VITALS
SYSTOLIC BLOOD PRESSURE: 122 MMHG | TEMPERATURE: 98.9 F | OXYGEN SATURATION: 93 % | DIASTOLIC BLOOD PRESSURE: 68 MMHG | RESPIRATION RATE: 16 BRPM | HEIGHT: 66 IN | BODY MASS INDEX: 34.13 KG/M2 | HEART RATE: 87 BPM | WEIGHT: 212.4 LBS

## 2018-01-11 DIAGNOSIS — J44.1 COPD EXACERBATION (HCC): Primary | ICD-10-CM

## 2018-01-11 PROCEDURE — 1123F ACP DISCUSS/DSCN MKR DOCD: CPT | Performed by: NURSE PRACTITIONER

## 2018-01-11 PROCEDURE — 4040F PNEUMOC VAC/ADMIN/RCVD: CPT | Performed by: NURSE PRACTITIONER

## 2018-01-11 PROCEDURE — G8427 DOCREV CUR MEDS BY ELIG CLIN: HCPCS | Performed by: NURSE PRACTITIONER

## 2018-01-11 PROCEDURE — 99213 OFFICE O/P EST LOW 20 MIN: CPT | Performed by: NURSE PRACTITIONER

## 2018-01-11 PROCEDURE — G8926 SPIRO NO PERF OR DOC: HCPCS | Performed by: NURSE PRACTITIONER

## 2018-01-11 PROCEDURE — G8599 NO ASA/ANTIPLAT THER USE RNG: HCPCS | Performed by: NURSE PRACTITIONER

## 2018-01-11 PROCEDURE — G8484 FLU IMMUNIZE NO ADMIN: HCPCS | Performed by: NURSE PRACTITIONER

## 2018-01-11 PROCEDURE — G8417 CALC BMI ABV UP PARAM F/U: HCPCS | Performed by: NURSE PRACTITIONER

## 2018-01-11 PROCEDURE — 3023F SPIROM DOC REV: CPT | Performed by: NURSE PRACTITIONER

## 2018-01-11 PROCEDURE — 1036F TOBACCO NON-USER: CPT | Performed by: NURSE PRACTITIONER

## 2018-01-11 RX ORDER — DOXYCYCLINE HYCLATE 100 MG/1
100 CAPSULE ORAL 2 TIMES DAILY
Qty: 20 CAPSULE | Refills: 0 | Status: SHIPPED | OUTPATIENT
Start: 2018-01-11 | End: 2018-01-21

## 2018-01-11 ASSESSMENT — ENCOUNTER SYMPTOMS
RHINORRHEA: 1
CHEST TIGHTNESS: 1
SORE THROAT: 1
SINUS PRESSURE: 1
WHEEZING: 1
COUGH: 1
SHORTNESS OF BREATH: 1

## 2018-01-11 NOTE — PROGRESS NOTES
lidocaine (LIDODERM) 5 % Place 1 patch onto the skin for 12 hours and off for 12 hours. Yes Kamilah Escalante MD   fluticasone [facility]) 50 MCG/ACT nasal spray 2 sprays by Nasal route daily Yes Sylvester Padilla NP   docusate sodium (COLACE) 100 MG capsule Take 100 mg by mouth as needed  Yes Historical Provider, MD   Elastic Bandages & Supports (V-2 HIGH COMPRESSION HOSE) MISC 10-20 mm Compression Hose Yes Kamilah Escalante MD   OXYGEN Inhale into the lungs 2 liters all the time Yes Historical Provider, MD   epoetin german (PROCRIT) 00785 UNIT/ML injection Inject 60,000 Units into the skin. Every two weeks as directed Yes Historical Provider, MD   Calcium Carbonate-Vitamin D (CALCIUM 600-D PO) Take 600 mg by mouth daily. Yes Historical Provider, MD   Multiple Vitamin (MULTIVITAMIN PO) Take 1 tablet by mouth daily. Yes Historical Provider, MD   Fiber TABS Take 1 capsule by mouth daily. Yes Historical Provider, MD   ranitidine (ZANTAC) 150 MG tablet Take 150 mg by mouth 2 times daily  Yes Historical Provider, MD     Patient's allergies, past medical, surgical, social and family histories were reviewed and updated as appropriate. Review of Systems   Constitutional: Positive for chills, fatigue and fever. HENT: Positive for congestion, postnasal drip, rhinorrhea, sinus pressure and sore throat. Negative for ear pain. Respiratory: Positive for cough, chest tightness, shortness of breath and wheezing. Chronic  States slightly worse than normal         Objective:   Physical Exam   Constitutional: He is oriented to person, place, and time. He appears well-developed and well-nourished. Nasal cannula in place. 2 L NC   HENT:   Right Ear: Tympanic membrane normal.   Left Ear: Tympanic membrane normal.   Nose: Nose normal.   Mouth/Throat: Uvula is midline and mucous membranes are normal. Posterior oropharyngeal erythema present. Cardiovascular: Normal rate, regular rhythm and normal heart sounds.

## 2018-02-06 ENCOUNTER — OFFICE VISIT (OUTPATIENT)
Dept: FAMILY MEDICINE CLINIC | Age: 83
End: 2018-02-06

## 2018-02-06 VITALS
DIASTOLIC BLOOD PRESSURE: 58 MMHG | BODY MASS INDEX: 33.02 KG/M2 | HEART RATE: 90 BPM | SYSTOLIC BLOOD PRESSURE: 122 MMHG | OXYGEN SATURATION: 92 % | WEIGHT: 204.6 LBS

## 2018-02-06 DIAGNOSIS — N18.30 TYPE 2 DIABETES MELLITUS WITH STAGE 3 CHRONIC KIDNEY DISEASE, WITHOUT LONG-TERM CURRENT USE OF INSULIN (HCC): Primary | ICD-10-CM

## 2018-02-06 DIAGNOSIS — E11.22 TYPE 2 DIABETES MELLITUS WITH STAGE 3 CHRONIC KIDNEY DISEASE, WITHOUT LONG-TERM CURRENT USE OF INSULIN (HCC): Primary | ICD-10-CM

## 2018-02-06 DIAGNOSIS — N18.30 CHRONIC KIDNEY DISEASE, STAGE III (MODERATE) (HCC): ICD-10-CM

## 2018-02-06 DIAGNOSIS — E11.51 DM (DIABETES MELLITUS) WITH PERIPHERAL VASCULAR COMPLICATION (HCC): ICD-10-CM

## 2018-02-06 DIAGNOSIS — I50.32 HEART FAILURE, DIASTOLIC, CHRONIC (HCC): ICD-10-CM

## 2018-02-06 DIAGNOSIS — I10 ESSENTIAL HYPERTENSION: Chronic | ICD-10-CM

## 2018-02-06 DIAGNOSIS — I44.2 THIRD DEGREE HEART BLOCK (HCC): ICD-10-CM

## 2018-02-06 DIAGNOSIS — D69.6 THROMBOCYTOPENIA (HCC): ICD-10-CM

## 2018-02-06 LAB — HBA1C MFR BLD: 7 %

## 2018-02-06 PROCEDURE — 99214 OFFICE O/P EST MOD 30 MIN: CPT | Performed by: FAMILY MEDICINE

## 2018-02-06 PROCEDURE — G8417 CALC BMI ABV UP PARAM F/U: HCPCS | Performed by: FAMILY MEDICINE

## 2018-02-06 PROCEDURE — G8599 NO ASA/ANTIPLAT THER USE RNG: HCPCS | Performed by: FAMILY MEDICINE

## 2018-02-06 PROCEDURE — 4040F PNEUMOC VAC/ADMIN/RCVD: CPT | Performed by: FAMILY MEDICINE

## 2018-02-06 PROCEDURE — 83036 HEMOGLOBIN GLYCOSYLATED A1C: CPT | Performed by: FAMILY MEDICINE

## 2018-02-06 PROCEDURE — 1123F ACP DISCUSS/DSCN MKR DOCD: CPT | Performed by: FAMILY MEDICINE

## 2018-02-06 PROCEDURE — G8484 FLU IMMUNIZE NO ADMIN: HCPCS | Performed by: FAMILY MEDICINE

## 2018-02-06 PROCEDURE — G8427 DOCREV CUR MEDS BY ELIG CLIN: HCPCS | Performed by: FAMILY MEDICINE

## 2018-02-06 PROCEDURE — 1036F TOBACCO NON-USER: CPT | Performed by: FAMILY MEDICINE

## 2018-02-06 NOTE — PATIENT INSTRUCTIONS
good, quick way to make sure that you have a balanced meal. It also helps you spread carbs throughout the day. ¨ Divide your plate by types of foods. Put non-starchy vegetables on half the plate, meat or other protein food on one-quarter of the plate, and a grain or starchy vegetable in the final quarter of the plate. To this you can add a small piece of fruit and 1 cup of milk or yogurt, depending on how many carbs you are supposed to eat at a meal.  · Try to eat about the same amount of carbs at each meal. Do not \"save up\" your daily allowance of carbs to eat at one meal.  · Proteins have very little or no carbs per serving. Examples of proteins are beef, chicken, turkey, fish, eggs, tofu, cheese, cottage cheese, and peanut butter. A serving size of meat is 3 ounces, which is about the size of a deck of cards. Examples of meat substitute serving sizes (equal to 1 ounce of meat) are 1/4 cup of cottage cheese, 1 egg, 1 tablespoon of peanut butter, and ½ cup of tofu. How can you eat out and still eat healthy? · Learn to estimate the serving sizes of foods that have carbohydrate. If you measure food at home, it will be easier to estimate the amount in a serving of restaurant food. · If the meal you order has too much carbohydrate (such as potatoes, corn, or baked beans), ask to have a low-carbohydrate food instead. Ask for a salad or green vegetables. · If you use insulin, check your blood sugar before and after eating out to help you plan how much to eat in the future. · If you eat more carbohydrate at a meal than you had planned, take a walk or do other exercise. This will help lower your blood sugar. What else should you know? · Limit saturated fat, such as the fat from meat and dairy products. This is a healthy choice because people who have diabetes are at higher risk of heart disease. So choose lean cuts of meat and nonfat or low-fat dairy products.  Use olive or canola oil instead of butter or shortening sports. · Limit alcohol to 2 drinks a day for men and 1 drink a day for women. · Eat plenty of fruits, vegetables, and low-fat dairy products. Eat less saturated and total fats. How is high blood pressure treated? · Your doctor will suggest making lifestyle changes. For example, your doctor may ask you to eat healthy foods, quit smoking, lose extra weight, and be more active. · If lifestyle changes don't help enough or your blood pressure is very high, you will have to take medicine every day. Follow-up care is a key part of your treatment and safety. Be sure to make and go to all appointments, and call your doctor if you are having problems. It's also a good idea to know your test results and keep a list of the medicines you take. Where can you learn more? Go to https://Gateway 3Dyonieb.[employer]. org and sign in to your [employer] account. Enter P501 in the [employer] box to learn more about \"Learning About High Blood Pressure. \"     If you do not have an account, please click on the \"Sign Up Now\" link. Current as of: September 21, 2016  Content Version: 11.5  © 9671-8602 [employer]. Care instructions adapted under license by [facility] ([facility]). If you have questions about a medical condition or this instruction, always ask your healthcare professional. [address] any warranty or liability for your use of this information.

## 2018-02-13 ENCOUNTER — OFFICE VISIT (OUTPATIENT)
Dept: CARDIOLOGY CLINIC | Age: 83
End: 2018-02-13

## 2018-02-13 ENCOUNTER — PROCEDURE VISIT (OUTPATIENT)
Dept: CARDIOLOGY CLINIC | Age: 83
End: 2018-02-13

## 2018-02-13 VITALS
SYSTOLIC BLOOD PRESSURE: 114 MMHG | HEART RATE: 80 BPM | OXYGEN SATURATION: 90 % | WEIGHT: 201 LBS | BODY MASS INDEX: 32.3 KG/M2 | DIASTOLIC BLOOD PRESSURE: 68 MMHG | HEIGHT: 66 IN

## 2018-02-13 DIAGNOSIS — I25.10 CORONARY ARTERY DISEASE INVOLVING NATIVE CORONARY ARTERY OF NATIVE HEART WITHOUT ANGINA PECTORIS: Chronic | ICD-10-CM

## 2018-02-13 DIAGNOSIS — Z95.0 PACEMAKER: ICD-10-CM

## 2018-02-13 DIAGNOSIS — Z95.2 S/P AVR (AORTIC VALVE REPLACEMENT): Chronic | ICD-10-CM

## 2018-02-13 DIAGNOSIS — Z95.0 CARDIAC PACEMAKER IN SITU: ICD-10-CM

## 2018-02-13 DIAGNOSIS — I10 ESSENTIAL HYPERTENSION: Chronic | ICD-10-CM

## 2018-02-13 DIAGNOSIS — I44.1 MOBITZ TYPE 2 SECOND DEGREE ATRIOVENTRICULAR BLOCK: Primary | ICD-10-CM

## 2018-02-13 DIAGNOSIS — I44.2 COMPLETE AV BLOCK (HCC): ICD-10-CM

## 2018-02-13 DIAGNOSIS — C90.00 MULTIPLE MYELOMA, REMISSION STATUS UNSPECIFIED (HCC): ICD-10-CM

## 2018-02-13 PROCEDURE — 4040F PNEUMOC VAC/ADMIN/RCVD: CPT | Performed by: INTERNAL MEDICINE

## 2018-02-13 PROCEDURE — G8417 CALC BMI ABV UP PARAM F/U: HCPCS | Performed by: INTERNAL MEDICINE

## 2018-02-13 PROCEDURE — 1123F ACP DISCUSS/DSCN MKR DOCD: CPT | Performed by: INTERNAL MEDICINE

## 2018-02-13 PROCEDURE — 93280 PM DEVICE PROGR EVAL DUAL: CPT | Performed by: INTERNAL MEDICINE

## 2018-02-13 PROCEDURE — G8484 FLU IMMUNIZE NO ADMIN: HCPCS | Performed by: INTERNAL MEDICINE

## 2018-02-13 PROCEDURE — 1036F TOBACCO NON-USER: CPT | Performed by: INTERNAL MEDICINE

## 2018-02-13 PROCEDURE — G8599 NO ASA/ANTIPLAT THER USE RNG: HCPCS | Performed by: INTERNAL MEDICINE

## 2018-02-13 PROCEDURE — G8427 DOCREV CUR MEDS BY ELIG CLIN: HCPCS | Performed by: INTERNAL MEDICINE

## 2018-02-13 PROCEDURE — 99214 OFFICE O/P EST MOD 30 MIN: CPT | Performed by: INTERNAL MEDICINE

## 2018-02-13 NOTE — PROGRESS NOTES
[address]   Electrophysiology   Date: 2/13/2018     CC: Pacemaker follow up  HPI: I had the privilege of seeing Kiki Justin in follow up for AV block s/p PPM, CAD, HTN and AVR with bioprosthetic valve. He follows with Dr Wilder Welsh for Multiple Myeloma and thrombocytopenia. He was admitted 4/18/17 with fatigue, SOB, abdominal bloating, SOB and decreased appetite. He had Mobitz type I on telemetry and was monitored. He readmitted 4/28/17 to the hospital with intermittent dizziness, feeling he may pass out but no syncope. He feels some sensations of pins and needles in his hands. Patient daughter checked his pulse and found his heart rate to be low in the 30s and and high 20s, and blood pressure was low in the 70s-80s. Telemetry revealed high grade AV block with pauses > 3 seconds on telemetry. He underwent dual chamber PPM placement on 4/25/17. After pacemaker placement, he was seen by NP after developing bleeding from insertion site with bruising. He was treated with Doxycycline 100 mg BID x 10 days. He follows with Dr Wilder Welsh for Multiple Myeloma and thrombocytopenia. Plt count on 4/28/17 was 85. ASA was held. Plt count improved to 108 on 7/21/17. Interval History: Today he is at the visit with his daughter. Patient states that he is doing well. Denies having any chest pressure. His shortness of breath is at baseline. He is on oxygen continuously. Recent testing and relevant Cardiac history:  Echo 4/19/17:  -Global ejection fraction is normal and estimated from 60 % to 65 %.  -No regional wall motion abnormalities are noted.   -Mildly dilated left atrium.   -The bioprosthetic aortic valve appears well seated with a maximum gradient of 22 mmHg and a mean gradient of 14 mmHg. No significant regurgitation noted.   -Mild-to-moderate mitral regurgitation.   -E/e'= 22.7 .   Cath 10/6/11: Prox LAD 40%, Prox OM1 50%, Mid RCA 30%, EF 40%  Device present: Medtronic dual chamber PPM      Past Medical History:   Diagnosis Date    Anemia     \"BORDERLINE CANCER\" AS PER DR GROVES    Anxiety     Arrhythmia     Arthritis     Blood transfusion 1996 AND 2006    CAD (coronary artery disease) 8/26/2013    Cancer ([facility] [address] 2008    SKIN    CHF (congestive heart failure) (HCC)     Chronic back pain     HISTORY OF LUMBAR FUSION    CKD (chronic kidney disease) stage 3, GFR 30-59 ml/min     Dr Nalini Christopher    Diabetes mellitus [facility])     Former cigarette smoker     Hyperlipemia     Hypertension     Multiple myeloma ([facility] [address] 2009    Renal insufficiency     RT MEDS        Past Surgical History:   Procedure Laterality Date    ABDOMINAL AORTIC ANEURYSM REPAIR      AORTIC VALVE REPLACEMENT  10/2011    [address]  04/2017    CARDIAC SURGERY      aortic valve replacement    CARPAL TUNNEL RELEASE      BILAT    COLONOSCOPY  11/19/12    ERCP  4/5/2016    Sphincterotomy, Balloon Sweep    JOINT REPLACEMENT      BILAT KNEE    SINUS SURGERY      SKIN CANCER EXCISION      TENDON MANIPULATION      RT ELBOW    UPPER GASTROINTESTINAL ENDOSCOPY  3/9/16    with EUS and esophageal and stomach biopsie    UPPER GASTROINTESTINAL ENDOSCOPY  04/12/2016    with Ablation    UVULOPALATOPHARYGOPLASTY         Allergies   Allergen Reactions    Metoprolol Succinate [Metoprolol] Shortness Of Breath and Palpitations    Penicillins Hives, Shortness Of Breath and Swelling    Procardia [Nifedipine] Anaphylaxis    Ultram [Tramadol Hcl] Other (See Comments)     Feels bad , upset stomach    Aspirin      Burning stomach    Nsaids Other (See Comments)     \"MY STOMACH BURNS\"    Tetanus Toxoids Other (See Comments)     ARM SWELLED    Ninlaro [Ixazomib] Rash       Social History:  Reviewed. reports that he quit smoking about 39 years ago. He has a 90.00 pack-year smoking history. He has never used smokeless tobacco. He reports that he does not drink alcohol or use drugs. Prescriptions   Medication Sig Dispense Refill    albuterol sulfate HFA (VENTOLIN HFA) 108 (90 Base) MCG/ACT inhaler Inhale 2 puffs into the lungs every 6 hours as needed for Wheezing 1 Inhaler 11    ALPRAZolam (XANAX) 1 MG tablet TAKE 1 TABLET BY MOUTH THREE TIMES DAILY AS NEEDED 90 tablet 0    ONE TOUCH ULTRASOFT LANCETS MISC USE AS DIRECTED 200 each 3    ONE TOUCH ULTRA TEST strip TEST AS DIRECTED TWICE DAILY 200 strip 3    METHYLPREDNISOLONE SODIUM SUCC IJ Inject 60 mg as directed      olopatadine (PATANOL) 0.1 % ophthalmic solution INSTILL 1 DROP IN BOTH EYES TWICE DAILY 3 Bottle 3    dexamethasone (DECADRON) 4 MG tablet Take 1 tablet by mouth daily for 2 days after treatment 8 tablet 6    valACYclovir (VALTREX) 500 MG tablet Take 1 tablet by mouth 2 times daily Starting 1 week after first infusion 60 tablet 6    promethazine (PHENERGAN) 25 MG tablet Take 0.5-1 tablets by mouth every 6 hours as needed for Nausea 30 tablet 0    oxyCODONE 5 MG capsule Take 1 capsule by mouth every 4 hours as needed for Pain 60 capsule 0    polyethylene glycol (GLYCOLAX) powder Take 17 g by mouth daily as needed       guaiFENesin 400 MG tablet Take 400 mg by mouth 2 times daily      fluticasone (FLONASE) 50 MCG/ACT nasal spray 2 sprays by Nasal route daily 1 Bottle 3    docusate sodium (COLACE) 100 MG capsule Take 100 mg by mouth as needed       Elastic Bandages & Supports (V-2 HIGH COMPRESSION HOSE) MISC 10-20 mm Compression Hose 1 each 2    OXYGEN Inhale into the lungs 2 liters all the time      epoetin german (PROCRIT) 83649 UNIT/ML injection Inject 60,000 Units into the skin. Every two weeks as directed      Calcium Carbonate-Vitamin D (CALCIUM 600-D PO) Take 600 mg by mouth daily.  Multiple Vitamin (MULTIVITAMIN PO) Take 1 tablet by mouth daily.  Fiber TABS Take 1 capsule by mouth daily.       ranitidine (ZANTAC) 150 MG tablet Take 150 mg by mouth 2 times daily        No current facility-administered medications for this visit. 1. 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    2. Cardiac pacemaker in situ    3. S/P AVR (aortic valve replacement)    4. Essential hypertension    5. Multiple myeloma, remission status unspecified ([facility] [address]    6. Coronary artery disease involving native coronary artery of native heart without angina pectoris         Assessment and plan:   - Symptomatic bradycardia, Mobitz type 2 second degree AV block   S/p dual chamber pacemaker implant 4/25/17   Device checks today and functions normally. Total V pacing is 4.4%. This has been decreased compared to prior interrogation which was 25%        - Aortic valve disease   - hx of AVR   - 4/2017 Echo showed well seated AVR with no significant regurgitation    HTN:  Controlled. Continue current medications. - Mild non-obstructive CAD; Not on ASA due to thrombocytopenia.    - LDL at target and is very low 35 4/19/2017    Follow up with general cardiology. F/u one year.      Gian Varela MD, MPH  [facility]   Office: [phone]

## 2018-04-11 ENCOUNTER — OFFICE VISIT (OUTPATIENT)
Dept: FAMILY MEDICINE CLINIC | Age: 83
End: 2018-04-11

## 2018-04-11 ENCOUNTER — TELEPHONE (OUTPATIENT)
Dept: FAMILY MEDICINE CLINIC | Age: 83
End: 2018-04-11

## 2018-04-11 VITALS
HEIGHT: 66 IN | HEART RATE: 82 BPM | DIASTOLIC BLOOD PRESSURE: 70 MMHG | RESPIRATION RATE: 16 BRPM | OXYGEN SATURATION: 93 % | SYSTOLIC BLOOD PRESSURE: 138 MMHG | TEMPERATURE: 98.1 F | WEIGHT: 201.4 LBS | BODY MASS INDEX: 32.37 KG/M2

## 2018-04-11 DIAGNOSIS — I50.32 CHRONIC DIASTOLIC HEART FAILURE (HCC): ICD-10-CM

## 2018-04-11 DIAGNOSIS — I10 ESSENTIAL HYPERTENSION: Chronic | ICD-10-CM

## 2018-04-11 DIAGNOSIS — Z95.0 PACEMAKER: ICD-10-CM

## 2018-04-11 DIAGNOSIS — N18.30 CKD (CHRONIC KIDNEY DISEASE) STAGE 3, GFR 30-59 ML/MIN (HCC): ICD-10-CM

## 2018-04-11 DIAGNOSIS — N18.30 TYPE 2 DIABETES MELLITUS WITH STAGE 3 CHRONIC KIDNEY DISEASE, WITHOUT LONG-TERM CURRENT USE OF INSULIN (HCC): ICD-10-CM

## 2018-04-11 DIAGNOSIS — I25.10 CORONARY ARTERY DISEASE INVOLVING NATIVE CORONARY ARTERY OF NATIVE HEART WITHOUT ANGINA PECTORIS: Chronic | ICD-10-CM

## 2018-04-11 DIAGNOSIS — D69.6 THROMBOCYTOPENIA (HCC): ICD-10-CM

## 2018-04-11 DIAGNOSIS — C90.00 MULTIPLE MYELOMA, REMISSION STATUS UNSPECIFIED (HCC): ICD-10-CM

## 2018-04-11 DIAGNOSIS — I73.9 PVD (PERIPHERAL VASCULAR DISEASE) (HCC): ICD-10-CM

## 2018-04-11 DIAGNOSIS — Z95.2 S/P AVR (AORTIC VALVE REPLACEMENT): Chronic | ICD-10-CM

## 2018-04-11 DIAGNOSIS — H25.9 AGE-RELATED CATARACT OF BOTH EYES, UNSPECIFIED AGE-RELATED CATARACT TYPE: Primary | ICD-10-CM

## 2018-04-11 DIAGNOSIS — I44.1 MOBITZ TYPE 2 SECOND DEGREE ATRIOVENTRICULAR BLOCK: ICD-10-CM

## 2018-04-11 DIAGNOSIS — E11.22 TYPE 2 DIABETES MELLITUS WITH STAGE 3 CHRONIC KIDNEY DISEASE, WITHOUT LONG-TERM CURRENT USE OF INSULIN (HCC): ICD-10-CM

## 2018-04-11 DIAGNOSIS — E78.00 PURE HYPERCHOLESTEROLEMIA: Chronic | ICD-10-CM

## 2018-04-11 DIAGNOSIS — Z01.818 PREOP EXAMINATION: ICD-10-CM

## 2018-04-11 PROCEDURE — G8427 DOCREV CUR MEDS BY ELIG CLIN: HCPCS | Performed by: NURSE PRACTITIONER

## 2018-04-11 PROCEDURE — 1036F TOBACCO NON-USER: CPT | Performed by: NURSE PRACTITIONER

## 2018-04-11 PROCEDURE — G8417 CALC BMI ABV UP PARAM F/U: HCPCS | Performed by: NURSE PRACTITIONER

## 2018-04-11 PROCEDURE — 4040F PNEUMOC VAC/ADMIN/RCVD: CPT | Performed by: NURSE PRACTITIONER

## 2018-04-11 PROCEDURE — G8599 NO ASA/ANTIPLAT THER USE RNG: HCPCS | Performed by: NURSE PRACTITIONER

## 2018-04-11 PROCEDURE — 1123F ACP DISCUSS/DSCN MKR DOCD: CPT | Performed by: NURSE PRACTITIONER

## 2018-04-11 PROCEDURE — 99215 OFFICE O/P EST HI 40 MIN: CPT | Performed by: NURSE PRACTITIONER

## 2018-04-12 ENCOUNTER — TELEPHONE (OUTPATIENT)
Dept: FAMILY MEDICINE CLINIC | Age: 83
End: 2018-04-12

## 2018-04-12 PROBLEM — J44.1 COPD EXACERBATION (HCC): Status: ACTIVE | Noted: 2018-04-12

## 2018-04-30 ENCOUNTER — TELEPHONE (OUTPATIENT)
Dept: FAMILY MEDICINE CLINIC | Age: 83
End: 2018-04-30

## 2018-05-08 ENCOUNTER — TELEPHONE (OUTPATIENT)
Dept: FAMILY MEDICINE CLINIC | Age: 83
End: 2018-05-08

## 2018-05-10 ENCOUNTER — OFFICE VISIT (OUTPATIENT)
Dept: FAMILY MEDICINE CLINIC | Age: 83
End: 2018-05-10

## 2018-05-10 VITALS
WEIGHT: 203.2 LBS | SYSTOLIC BLOOD PRESSURE: 128 MMHG | HEART RATE: 80 BPM | BODY MASS INDEX: 32.8 KG/M2 | OXYGEN SATURATION: 94 % | DIASTOLIC BLOOD PRESSURE: 74 MMHG

## 2018-05-10 DIAGNOSIS — I10 ESSENTIAL HYPERTENSION: Chronic | ICD-10-CM

## 2018-05-10 DIAGNOSIS — N18.30 TYPE 2 DIABETES MELLITUS WITH STAGE 3 CHRONIC KIDNEY DISEASE, WITHOUT LONG-TERM CURRENT USE OF INSULIN (HCC): Primary | ICD-10-CM

## 2018-05-10 DIAGNOSIS — E11.22 TYPE 2 DIABETES MELLITUS WITH STAGE 3 CHRONIC KIDNEY DISEASE, WITHOUT LONG-TERM CURRENT USE OF INSULIN (HCC): Primary | ICD-10-CM

## 2018-05-10 LAB — HBA1C MFR BLD: 6.2 %

## 2018-05-10 PROCEDURE — G8427 DOCREV CUR MEDS BY ELIG CLIN: HCPCS | Performed by: FAMILY MEDICINE

## 2018-05-10 PROCEDURE — 1123F ACP DISCUSS/DSCN MKR DOCD: CPT | Performed by: FAMILY MEDICINE

## 2018-05-10 PROCEDURE — 83036 HEMOGLOBIN GLYCOSYLATED A1C: CPT | Performed by: FAMILY MEDICINE

## 2018-05-10 PROCEDURE — 99213 OFFICE O/P EST LOW 20 MIN: CPT | Performed by: FAMILY MEDICINE

## 2018-05-10 PROCEDURE — 1036F TOBACCO NON-USER: CPT | Performed by: FAMILY MEDICINE

## 2018-05-10 PROCEDURE — G8599 NO ASA/ANTIPLAT THER USE RNG: HCPCS | Performed by: FAMILY MEDICINE

## 2018-05-10 PROCEDURE — 4040F PNEUMOC VAC/ADMIN/RCVD: CPT | Performed by: FAMILY MEDICINE

## 2018-05-10 PROCEDURE — G8417 CALC BMI ABV UP PARAM F/U: HCPCS | Performed by: FAMILY MEDICINE

## 2018-05-22 ENCOUNTER — NURSE ONLY (OUTPATIENT)
Dept: CARDIOLOGY CLINIC | Age: 83
End: 2018-05-22

## 2018-05-22 DIAGNOSIS — I44.1 MOBITZ TYPE 2 SECOND DEGREE ATRIOVENTRICULAR BLOCK: ICD-10-CM

## 2018-05-22 DIAGNOSIS — Z95.0 PACEMAKER: ICD-10-CM

## 2018-05-22 PROCEDURE — 93294 REM INTERROG EVL PM/LDLS PM: CPT | Performed by: INTERNAL MEDICINE

## 2018-05-22 PROCEDURE — 93296 REM INTERROG EVL PM/IDS: CPT | Performed by: INTERNAL MEDICINE

## 2018-06-18 LAB
ABO/RH: NORMAL
ANTIBODY SCREEN: NORMAL
ANTIBODY SCREEN: NORMAL

## 2018-06-19 ENCOUNTER — HOSPITAL ENCOUNTER (OUTPATIENT)
Dept: ONCOLOGY | Age: 83
Discharge: HOME | End: 2018-06-30
Attending: NURSE PRACTITIONER | Admitting: NURSE PRACTITIONER

## 2018-06-19 VITALS
HEART RATE: 79 BPM | TEMPERATURE: 97 F | OXYGEN SATURATION: 99 % | RESPIRATION RATE: 18 BRPM | DIASTOLIC BLOOD PRESSURE: 67 MMHG | SYSTOLIC BLOOD PRESSURE: 142 MMHG

## 2018-06-19 LAB
BLOOD BANK DISPENSE STATUS: NORMAL
BLOOD BANK DISPENSE STATUS: NORMAL
BLOOD BANK PRODUCT CODE: NORMAL
BLOOD BANK PRODUCT CODE: NORMAL
BPU ID: NORMAL
BPU ID: NORMAL
DESCRIPTION BLOOD BANK: NORMAL
DESCRIPTION BLOOD BANK: NORMAL

## 2018-06-19 RX ORDER — ACETAMINOPHEN 325 MG/1
650 TABLET ORAL SEE ADMIN INSTRUCTIONS
Status: COMPLETED | OUTPATIENT
Start: 2018-06-19 | End: 2018-06-19

## 2018-06-19 RX ORDER — DIPHENHYDRAMINE HCL 25 MG
25 TABLET ORAL SEE ADMIN INSTRUCTIONS
Status: COMPLETED | OUTPATIENT
Start: 2018-06-19 | End: 2018-06-19

## 2018-06-19 RX ORDER — 0.9 % SODIUM CHLORIDE 0.9 %
250 INTRAVENOUS SOLUTION INTRAVENOUS ONCE
Status: DISCONTINUED | OUTPATIENT
Start: 2018-06-19 | End: 2018-06-20 | Stop reason: HOSPADM

## 2018-06-19 RX ADMIN — ACETAMINOPHEN 650 MG: 325 TABLET ORAL at 09:35

## 2018-06-19 RX ADMIN — Medication 25 MG: at 09:35

## 2018-06-19 ASSESSMENT — PAIN SCALES - GENERAL: PAINLEVEL_OUTOF10: 0

## 2018-06-30 ENCOUNTER — OFFICE VISIT (OUTPATIENT)
Dept: FAMILY MEDICINE CLINIC | Age: 83
End: 2018-06-30

## 2018-06-30 VITALS
WEIGHT: 201 LBS | HEART RATE: 71 BPM | BODY MASS INDEX: 32.3 KG/M2 | HEIGHT: 66 IN | SYSTOLIC BLOOD PRESSURE: 136 MMHG | TEMPERATURE: 98.6 F | OXYGEN SATURATION: 96 % | DIASTOLIC BLOOD PRESSURE: 60 MMHG

## 2018-06-30 DIAGNOSIS — T14.8XXA BLOOD BLISTER: Primary | ICD-10-CM

## 2018-06-30 PROCEDURE — 1036F TOBACCO NON-USER: CPT | Performed by: FAMILY MEDICINE

## 2018-06-30 PROCEDURE — 4040F PNEUMOC VAC/ADMIN/RCVD: CPT | Performed by: FAMILY MEDICINE

## 2018-06-30 PROCEDURE — G8417 CALC BMI ABV UP PARAM F/U: HCPCS | Performed by: FAMILY MEDICINE

## 2018-06-30 PROCEDURE — G8599 NO ASA/ANTIPLAT THER USE RNG: HCPCS | Performed by: FAMILY MEDICINE

## 2018-06-30 PROCEDURE — 1123F ACP DISCUSS/DSCN MKR DOCD: CPT | Performed by: FAMILY MEDICINE

## 2018-06-30 PROCEDURE — 99213 OFFICE O/P EST LOW 20 MIN: CPT | Performed by: FAMILY MEDICINE

## 2018-06-30 PROCEDURE — G8428 CUR MEDS NOT DOCUMENT: HCPCS | Performed by: FAMILY MEDICINE

## 2018-07-01 ENCOUNTER — HOSPITAL ENCOUNTER (OUTPATIENT)
Dept: ONCOLOGY | Age: 83
Discharge: HOME | End: 2018-07-31
Attending: NURSE PRACTITIONER | Admitting: NURSE PRACTITIONER

## 2018-07-09 ENCOUNTER — HOSPITAL ENCOUNTER (OUTPATIENT)
Dept: ONCOLOGY | Age: 83
Discharge: HOME | End: 2018-07-10
Admitting: NURSE PRACTITIONER

## 2018-07-09 LAB
ABO/RH: NORMAL
ANTIBODY SCREEN: NORMAL
ANTIBODY SCREEN: NORMAL

## 2018-07-09 RX ORDER — 0.9 % SODIUM CHLORIDE 0.9 %
250 INTRAVENOUS SOLUTION INTRAVENOUS ONCE
Status: DISCONTINUED | OUTPATIENT
Start: 2018-07-09 | End: 2018-07-11 | Stop reason: HOSPADM

## 2018-07-09 NOTE — PROGRESS NOTES
Patient to department per wheelchair accompanied by family member. Type and cross match drawn from port a cath. Port flushes easily with brisk blood return present. Patient to return tomorrow for transfusion of 3 units of prc's.

## 2018-07-10 ENCOUNTER — HOSPITAL ENCOUNTER (OUTPATIENT)
Dept: ONCOLOGY | Age: 83
Discharge: HOME | End: 2018-07-11
Admitting: NURSE PRACTITIONER

## 2018-07-10 VITALS
DIASTOLIC BLOOD PRESSURE: 77 MMHG | RESPIRATION RATE: 18 BRPM | SYSTOLIC BLOOD PRESSURE: 163 MMHG | HEART RATE: 74 BPM | TEMPERATURE: 96.9 F

## 2018-07-10 LAB
BLOOD BANK DISPENSE STATUS: NORMAL
BLOOD BANK PRODUCT CODE: NORMAL
BPU ID: NORMAL
DESCRIPTION BLOOD BANK: NORMAL

## 2018-07-10 RX ORDER — DIPHENHYDRAMINE HCL 25 MG
TABLET ORAL
Status: COMPLETED
Start: 2018-07-10 | End: 2018-07-10

## 2018-07-10 RX ORDER — 0.9 % SODIUM CHLORIDE 0.9 %
250 INTRAVENOUS SOLUTION INTRAVENOUS ONCE
Status: DISCONTINUED | OUTPATIENT
Start: 2018-07-10 | End: 2018-07-12 | Stop reason: HOSPADM

## 2018-07-10 RX ORDER — ACETAMINOPHEN 325 MG/1
TABLET ORAL
Status: COMPLETED
Start: 2018-07-10 | End: 2018-07-10

## 2018-07-10 RX ORDER — ACETAMINOPHEN 325 MG/1
650 TABLET ORAL SEE ADMIN INSTRUCTIONS
Status: COMPLETED | OUTPATIENT
Start: 2018-07-10 | End: 2018-07-10

## 2018-07-10 RX ORDER — SODIUM CHLORIDE 0.9 % (FLUSH) 0.9 %
SYRINGE (ML) INJECTION
Status: DISPENSED
Start: 2018-07-10 | End: 2018-07-10

## 2018-07-10 RX ORDER — DIPHENHYDRAMINE HCL 25 MG
25 TABLET ORAL SEE ADMIN INSTRUCTIONS
Status: COMPLETED | OUTPATIENT
Start: 2018-07-10 | End: 2018-07-10

## 2018-07-10 RX ADMIN — Medication 25 MG: at 09:39

## 2018-07-10 RX ADMIN — ACETAMINOPHEN 650 MG: 325 TABLET ORAL at 09:39

## 2018-07-10 ASSESSMENT — PAIN SCALES - GENERAL: PAINLEVEL_OUTOF10: 0

## 2018-07-10 NOTE — PROGRESS NOTES
Second unit of blood complete. Vitals stable. Blood bank called for third unit. Daughter remains at bedside.

## 2018-07-10 NOTE — PROGRESS NOTES
Third unit of prc's complete. No signs of a transfusion rx noted. Vitals stable with slight rise in b/p reading. Port a cath flushed and deaccessed. Patient discharged per wheelchair with daughter. Portable oxygen in place at 3 liters/ nasal cannula. To report to oncologist's office on Thursday to have cbc rechecked.

## 2018-07-10 NOTE — PROGRESS NOTES
Tolerating blood transfusion well. No signs of an adverse reaction. Asleep in recliner. Daughter remains in the room.

## 2018-07-16 ENCOUNTER — HOSPITAL ENCOUNTER (OUTPATIENT)
Dept: ONCOLOGY | Age: 83
Discharge: HOME | End: 2018-07-17
Admitting: NURSE PRACTITIONER

## 2018-07-16 LAB
ABO/RH: NORMAL
ANTIBODY SCREEN: NORMAL
ANTIBODY SCREEN: NORMAL

## 2018-07-16 RX ORDER — SODIUM CHLORIDE 0.9 % (FLUSH) 0.9 %
10 SYRINGE (ML) INJECTION PRN
Status: DISCONTINUED | OUTPATIENT
Start: 2018-07-16 | End: 2018-07-18 | Stop reason: HOSPADM

## 2018-08-01 ENCOUNTER — HOSPITAL ENCOUNTER (OUTPATIENT)
Dept: ONCOLOGY | Age: 83
Discharge: HOME | End: 2018-08-31
Attending: NURSE PRACTITIONER | Admitting: NURSE PRACTITIONER

## 2018-08-06 LAB
BASOPHILS ABSOLUTE: ABNORMAL /ΜL
BASOPHILS RELATIVE PERCENT: ABNORMAL %
EOSINOPHILS ABSOLUTE: 0.01 /ΜL
EOSINOPHILS RELATIVE PERCENT: 0.3 %
HCT VFR BLD CALC: 22.3 % (ref 41–53)
HEMOGLOBIN: 6.9 G/DL (ref 13.5–17.5)
LYMPHOCYTES ABSOLUTE: 1.29 /ΜL
LYMPHOCYTES RELATIVE PERCENT: 36.2 %
MCH RBC QN AUTO: 29.7 PG
MCHC RBC AUTO-ENTMCNC: 30.9 G/DL
MCV RBC AUTO: 96.1 FL
MONOCYTES ABSOLUTE: 0.27 /ΜL
MONOCYTES RELATIVE PERCENT: 7.5 %
NEUTROPHILS ABSOLUTE: ABNORMAL /ΜL
NEUTROPHILS RELATIVE PERCENT: ABNORMAL %
PDW BLD-RTO: 26.3 %
PLATELET # BLD: 17 K/ΜL
PMV BLD AUTO: ABNORMAL FL
RBC # BLD: 2.32 10^6/ΜL
WBC # BLD: 3.6 10^3/ML

## 2018-08-07 ENCOUNTER — HOSPITAL ENCOUNTER (OUTPATIENT)
Dept: ONCOLOGY | Age: 83
Discharge: HOME | End: 2018-08-08
Admitting: NURSE PRACTITIONER

## 2018-08-07 VITALS
SYSTOLIC BLOOD PRESSURE: 172 MMHG | HEART RATE: 77 BPM | DIASTOLIC BLOOD PRESSURE: 81 MMHG | TEMPERATURE: 97.7 F | RESPIRATION RATE: 20 BRPM

## 2018-08-07 LAB
ABO/RH: NORMAL
ANTIBODY SCREEN: NORMAL
ANTIBODY SCREEN: NORMAL
BLOOD BANK DISPENSE STATUS: NORMAL
BLOOD BANK DISPENSE STATUS: NORMAL
BLOOD BANK PRODUCT CODE: NORMAL
BLOOD BANK PRODUCT CODE: NORMAL
BPU ID: NORMAL
BPU ID: NORMAL
DESCRIPTION BLOOD BANK: NORMAL
DESCRIPTION BLOOD BANK: NORMAL

## 2018-08-07 RX ORDER — 0.9 % SODIUM CHLORIDE 0.9 %
250 INTRAVENOUS SOLUTION INTRAVENOUS ONCE
Status: DISCONTINUED | OUTPATIENT
Start: 2018-08-07 | End: 2018-08-09 | Stop reason: HOSPADM

## 2018-08-07 RX ORDER — SODIUM CHLORIDE 0.9 % (FLUSH) 0.9 %
SYRINGE (ML) INJECTION
Status: DISPENSED
Start: 2018-08-07 | End: 2018-08-07

## 2018-08-07 RX ORDER — ACETAMINOPHEN 325 MG/1
650 TABLET ORAL SEE ADMIN INSTRUCTIONS
Status: COMPLETED | OUTPATIENT
Start: 2018-08-07 | End: 2018-08-07

## 2018-08-07 RX ORDER — SODIUM CHLORIDE 9 MG/ML
INJECTION, SOLUTION INTRAVENOUS
Status: DISPENSED
Start: 2018-08-07 | End: 2018-08-07

## 2018-08-07 RX ORDER — DIPHENHYDRAMINE HCL 25 MG
25 TABLET ORAL SEE ADMIN INSTRUCTIONS
Status: COMPLETED | OUTPATIENT
Start: 2018-08-07 | End: 2018-08-07

## 2018-08-07 RX ADMIN — Medication 25 MG: at 11:09

## 2018-08-07 RX ADMIN — ACETAMINOPHEN 650 MG: 325 TABLET ORAL at 11:10

## 2018-08-07 ASSESSMENT — PAIN SCALES - GENERAL: PAINLEVEL_OUTOF10: 0

## 2018-08-07 NOTE — PROGRESS NOTES
Patient's second unit of blood infusing. Patient watching TV and has no complaints. Daughter in room with patient.

## 2018-08-07 NOTE — PROGRESS NOTES
Blood tranfusion complete. No signs of an adverse reaction noted. Port a cath flushed with 10 ml of saline and deaccessed. Patient discharged per wheelchair with daughter. To follow up with his oncologist next week for chemotherapy.

## 2018-08-07 NOTE — PROGRESS NOTES
Patient to department per wheelchair accompanied by daughter. Patient is to receive transfusion of 2 units of prc's. Right chest port a cath is accessed. Type and screen drawn. Consent for blood obtained.

## 2018-08-07 NOTE — PROGRESS NOTES
Patient's second unit of blood infusing. Patient watching TV and has no complaints. Wife in room with patient.

## 2018-08-09 ENCOUNTER — OFFICE VISIT (OUTPATIENT)
Dept: FAMILY MEDICINE CLINIC | Age: 83
End: 2018-08-09

## 2018-08-09 VITALS
DIASTOLIC BLOOD PRESSURE: 68 MMHG | BODY MASS INDEX: 32.54 KG/M2 | WEIGHT: 201.6 LBS | SYSTOLIC BLOOD PRESSURE: 138 MMHG | HEART RATE: 68 BPM | OXYGEN SATURATION: 79 %

## 2018-08-09 DIAGNOSIS — D46.9 MDS (MYELODYSPLASTIC SYNDROME) (HCC): ICD-10-CM

## 2018-08-09 DIAGNOSIS — F41.9 ANXIETY: ICD-10-CM

## 2018-08-09 DIAGNOSIS — J44.1 COPD EXACERBATION (HCC): ICD-10-CM

## 2018-08-09 DIAGNOSIS — E11.22 TYPE 2 DIABETES MELLITUS WITH STAGE 3 CHRONIC KIDNEY DISEASE, WITHOUT LONG-TERM CURRENT USE OF INSULIN (HCC): Primary | ICD-10-CM

## 2018-08-09 DIAGNOSIS — N18.30 TYPE 2 DIABETES MELLITUS WITH STAGE 3 CHRONIC KIDNEY DISEASE, WITHOUT LONG-TERM CURRENT USE OF INSULIN (HCC): Primary | ICD-10-CM

## 2018-08-09 DIAGNOSIS — I50.32 CHRONIC DIASTOLIC HEART FAILURE (HCC): ICD-10-CM

## 2018-08-09 DIAGNOSIS — I10 ESSENTIAL HYPERTENSION: Chronic | ICD-10-CM

## 2018-08-09 PROCEDURE — G8427 DOCREV CUR MEDS BY ELIG CLIN: HCPCS | Performed by: FAMILY MEDICINE

## 2018-08-09 PROCEDURE — G8926 SPIRO NO PERF OR DOC: HCPCS | Performed by: FAMILY MEDICINE

## 2018-08-09 PROCEDURE — 1036F TOBACCO NON-USER: CPT | Performed by: FAMILY MEDICINE

## 2018-08-09 PROCEDURE — G8599 NO ASA/ANTIPLAT THER USE RNG: HCPCS | Performed by: FAMILY MEDICINE

## 2018-08-09 PROCEDURE — 99214 OFFICE O/P EST MOD 30 MIN: CPT | Performed by: FAMILY MEDICINE

## 2018-08-09 PROCEDURE — 1123F ACP DISCUSS/DSCN MKR DOCD: CPT | Performed by: FAMILY MEDICINE

## 2018-08-09 PROCEDURE — G8417 CALC BMI ABV UP PARAM F/U: HCPCS | Performed by: FAMILY MEDICINE

## 2018-08-09 PROCEDURE — 1101F PT FALLS ASSESS-DOCD LE1/YR: CPT | Performed by: FAMILY MEDICINE

## 2018-08-09 PROCEDURE — 3023F SPIROM DOC REV: CPT | Performed by: FAMILY MEDICINE

## 2018-08-09 PROCEDURE — 4040F PNEUMOC VAC/ADMIN/RCVD: CPT | Performed by: FAMILY MEDICINE

## 2018-08-09 RX ORDER — AZACITIDINE 100 MG/1
155 INJECTION, POWDER, LYOPHILIZED, FOR SOLUTION INTRAVENOUS; SUBCUTANEOUS
Status: ON HOLD | COMMUNITY
Start: 2018-07-18 | End: 2018-08-18 | Stop reason: HOSPADM

## 2018-08-09 RX ORDER — PREDNISONE 10 MG/1
1.5 TABLET ORAL
Status: ON HOLD | COMMUNITY
End: 2018-09-24 | Stop reason: HOSPADM

## 2018-08-09 NOTE — PATIENT INSTRUCTIONS
with your doctor or pharmacist before you use any other medicines. This includes over-the-counter medicines. Make sure your doctor knows all of the medicines, vitamins, herbal products, and supplements you take. Taking some medicines together can cause problems. Where can you learn more? Go to [email or web address].[email or web address] org and sign in to your [employer] account. Enter J360 in the [facility] box to learn more about \"Learning About Metformin for Type 2 Diabetes. \"     If you do not have an account, please click on the \"Sign Up Now\" link. Current as of: December 7, 2017  Content Version: 11.7  © 4825-9656 [employer]. Care instructions adapted under license by [facility] ([facility]). If you have questions about a medical condition or this instruction, always ask your healthcare professional. [address] any warranty or liability for your use of this information.

## 2018-08-09 NOTE — PROGRESS NOTES
(PHENERGAN) 25 MG tablet Take 0.5-1 tablets by mouth every 6 hours as needed for Nausea 30 tablet 0    oxyCODONE 5 MG capsule Take 1 capsule by mouth every 4 hours as needed for Pain 60 capsule 0    polyethylene glycol (GLYCOLAX) powder Take 17 g by mouth daily as needed       guaiFENesin 400 MG tablet Take 400 mg by mouth 2 times daily      fluticasone (FLONASE) 50 MCG/ACT nasal spray 2 sprays by Nasal route daily 1 Bottle 3    docusate sodium (COLACE) 100 MG capsule Take 100 mg by mouth as needed       Elastic Bandages & Supports (V-2 HIGH COMPRESSION HOSE) MISC 10-20 mm Compression Hose 1 each 2    OXYGEN Inhale into the lungs 2 liters all the time      epoetin german (PROCRIT) 47040 UNIT/ML injection Inject 60,000 Units into the skin. Every two weeks as directed      Calcium Carbonate-Vitamin D (CALCIUM 600-D PO) Take 600 mg by mouth daily.  Multiple Vitamin (MULTIVITAMIN PO) Take 1 tablet by mouth daily.  Fiber TABS Take 1 capsule by mouth daily.  ranitidine (ZANTAC) 150 MG tablet Take 150 mg by mouth 2 times daily           Lab Results   Component Value Date    LABA1C 6.5 08/09/2018       OBJECTIVE:   Wt 201 lb 9.6 oz (91.4 kg)   BMI 32.54 kg/m²  pulse ox 91% with 3 L of oxygen. 79% with oxygen off for 10 minutes  Appearance alert, well appearing, and in no distress. Neck: No JVD, or bruits  Lungs: Chest is clear; no wheezes or rales. Heart: S1 and S2 normal, no murmurs, clicks, gallops or rubs. Regular rate and rhythm. Ext: No edema. Diabetic foot check per nurses note. Lab review: sees hematologist.     Assessment/Plan:    Kellee Liu was seen today for hypertension and diabetes.     Diagnoses and all orders for this visit:    Type 2 diabetes mellitus with stage 3 chronic kidney disease, without long-term current use of insulin (HCC)  -     POCT glycosylated hemoglobin (Hb A1C)  Reasonably stable/Controlled  Continue current treatment     Essential

## 2018-08-14 RX ORDER — ALBUTEROL SULFATE 90 UG/1
2 AEROSOL, METERED RESPIRATORY (INHALATION) EVERY 6 HOURS PRN
Qty: 1 INHALER | Refills: 11 | Status: ON HOLD | OUTPATIENT
Start: 2018-08-14 | End: 2018-09-24 | Stop reason: HOSPADM

## 2018-08-16 ENCOUNTER — TELEPHONE (OUTPATIENT)
Dept: FAMILY MEDICINE CLINIC | Age: 83
End: 2018-08-16

## 2018-08-16 DIAGNOSIS — J43.9 PULMONARY EMPHYSEMA, UNSPECIFIED EMPHYSEMA TYPE (HCC): Primary | ICD-10-CM

## 2018-08-19 ENCOUNTER — CARE COORDINATION (OUTPATIENT)
Dept: CASE MANAGEMENT | Age: 83
End: 2018-08-19

## 2018-08-19 NOTE — CARE COORDINATION
[address] Transitions Initial Follow Up Call    Call within 2 business days of discharge: Yes    Patient: Zeynep Lafleur Patient : 1935   MRN: <[id number]>  Reason for Admission: Anemia  Discharge Date: 18 RARS: Readmission Risk Score: 17     Spoke with: patient Troy [address] 13: [facility]    Non-face-to-face services provided:  Obtained and reviewed discharge summary and/or continuity of care documents    Care Transitions 24 Hour Call    Do you have any ongoing symptoms?:  No  Do you have a copy of your discharge instructions?:  Yes  Have you been contacted by a Yvette Bunn?:  No  Have you scheduled your follow up appointment?:  Yes  Were you discharged with any Home Care or Post Acute Services:  No  Do you feel like you have everything you need to keep you well at home?:  Yes  Care Transitions Interventions     Patient reports he's feeling real good, says energy level is much better than it was. Said he was sent over from chemo tx for transfusion. He said \"this isn't the first time I've been through it, I know what to do\". In very good spirits. Did not complete med review, states his daughter manages his medications. Will be seeing Dr. Eddi Lora (onc) on Monday at his next tx. Denies any needs at present time. Agreeable to f/u calls. Reminded patient that if they have any questions/concerns at anytime, they can always utilize CTC or call PCP/specialist as they have an MD on call .      Follow Up  Future Appointments  Date Time Provider Anton Tenorio   2018 9:00 AM ROOM 4B - ONC [facility] ONCOLOGY None   2018 1:15 PM SCHEDULE, [place] PHONE TRANSMISSION FF Cardio [facility]   2018 1:00 PM Edwardo Arias MD FF Cardio [facility]   2018 12:45 PM Miguel Babcock MD [facility]       Andrew Heath RN

## 2018-08-23 ENCOUNTER — CARE COORDINATION (OUTPATIENT)
Dept: CASE MANAGEMENT | Age: 83
End: 2018-08-23

## 2018-08-28 ENCOUNTER — NURSE ONLY (OUTPATIENT)
Dept: CARDIOLOGY CLINIC | Age: 83
End: 2018-08-28

## 2018-08-28 DIAGNOSIS — I44.1 MOBITZ TYPE 2 SECOND DEGREE ATRIOVENTRICULAR BLOCK: ICD-10-CM

## 2018-08-28 DIAGNOSIS — Z95.0 PACEMAKER: ICD-10-CM

## 2018-08-28 PROCEDURE — 93296 REM INTERROG EVL PM/IDS: CPT | Performed by: INTERNAL MEDICINE

## 2018-08-28 PROCEDURE — 93294 REM INTERROG EVL PM/LDLS PM: CPT | Performed by: INTERNAL MEDICINE

## 2018-08-28 NOTE — PROGRESS NOTES
includes Cancer in his brother and father; Diabetes in his father and mother; Heart Disease in his maternal grandmother and mother; High Blood Pressure in his mother; Stroke in his sister. Allergies: Metoprolol succinate [metoprolol]; Penicillins; Procardia [nifedipine]; Ultram [tramadol hcl]; Aspirin; Nsaids; Tetanus toxoids; and Ninlaro [ixazomib]   ROS:  [x]Full ROS obtained and negative except as mentioned in HPI     MEDICATIONS      Current Outpatient Prescriptions   Medication Sig Dispense Refill    ALPRAZolam (XANAX) 0.25 MG tablet Take 0.25 mg by mouth 3 times daily as needed for Sleep or Anxiety. [place] acetaminophen (TYLENOL) 500 MG tablet Take 1,000 mg by mouth every 6 hours as needed for Pain      albuterol (PROVENTIL) (5 MG/ML) 0.5% nebulizer solution Take 1 mL by nebulization 4 times daily as needed for Wheezing 120 each 3    albuterol sulfate HFA (VENTOLIN HFA) 108 (90 Base) MCG/ACT inhaler Inhale 2 puffs into the lungs every 6 hours as needed for Wheezing 1 Inhaler 11    DiphenhydrAMINE HCl (BENADRYL IJ) 50 mg       predniSONE (DELTASONE) 10 MG tablet Take 1.5 tablets by mouth      Nebulizers (COMPRESSOR/NEBULIZER) MISC Dispense 1 nebulizer 1 each 0    ONE TOUCH ULTRASOFT LANCETS MISC USE AS DIRECTED 200 each 3    ONE TOUCH ULTRA TEST strip TEST AS DIRECTED TWICE DAILY 200 strip 3    olopatadine (PATANOL) 0.1 % ophthalmic solution INSTILL 1 DROP IN BOTH EYES TWICE DAILY 3 Bottle 3    valACYclovir (VALTREX) 500 MG tablet Take 1 tablet by mouth 2 times daily Starting 1 week after first infusion 60 tablet 6    promethazine (PHENERGAN) 25 MG tablet Take 0.5-1 tablets by mouth every 6 hours as needed for Nausea 30 tablet 0    oxyCODONE 5 MG capsule Take 1 capsule by mouth every 4 hours as needed for Pain 60 capsule 0    polyethylene glycol (GLYCOLAX) powder Take 17 g by mouth daily as needed       guaiFENesin 400 MG tablet Take 400 mg by mouth 2 times daily      fluticasone (FLONASE) 50 MCG/ACT nasal spray 2 sprays by Nasal route daily (Patient taking differently: 2 sprays by Nasal route as needed ) 1 Bottle 3    docusate sodium (COLACE) 100 MG capsule Take 100 mg by mouth as needed       Elastic Bandages & Supports (V-2 HIGH COMPRESSION HOSE) MISC 10-20 mm Compression Hose 1 each 2    OXYGEN Inhale into the lungs 2 liters all the time      epoetin german (PROCRIT) 80796 UNIT/ML injection Inject 60,000 Units into the skin Every two weeks as directed       Calcium Carbonate-Vitamin D (CALCIUM 600-D PO) Take 600 mg by mouth daily.  Multiple Vitamin (MULTIVITAMIN PO) Take 1 tablet by mouth daily.  Fiber TABS Take 1 capsule by mouth daily.  ranitidine (ZANTAC) 150 MG tablet Take 150 mg by mouth 2 times daily        No current facility-administered medications for this visit.       Facility-Administered Medications Ordered in Other Visits   Medication Dose Route Frequency Provider Last Rate Last Dose    sodium chloride flush 0.9 % injection 10 mL  10 mL Intravenous PRN Corrinne Medici, MD         Reviewed with patient and will remain unchanged except as mentioned in A/P  PHYSICAL EXAM     Vitals:    08/31/18 1257   BP: 138/72   Resp: 16      Gen Alert, coop, no distress Heart  RRR, 1/6   Head NC, AT, no abnorm Abd  Soft, NT, +BS, no mass, no OM   Eyes PER, conj/corn clear Ext  Ext nl, AT, no C/C/E   Nose Nares nl, no drain, NT Pulse decr   Throat Lips, mucosa, tongue nl Skin Col/text/turg nl, no vis rash/les   Neck S/S, TM, NT, no bruit/JVD Psych Nl mood and affect   Lung decr bs Lymph   No cervical or axillary LA   Ch wall NT, no deform Neuro  Nl gross M/S exam       ASSESSMENT AND PLAN   ~AS   Date EF Detail   Sx   Chronic sob   Hx 10/11  AVR with 21 mm porcine valve   TTE 8/15  4/17 60%  65% Bio AV well seated MG 18, mild to mod TR  Bio AV well seated MG 14, mild to mod MR   Plan   Continue current meds  No ASA due to plts   ~CAD   Date EF Results   Sx   sob   Hx   No intervention

## 2018-08-28 NOTE — LETTER
[facility] [phone]  [facility] [phone]  Yudith Lim [address] [place] [address] [phone]    Pacemaker/Defibrillator Clinic          08/29/18        [address] [address] [address] [facility] [id number]        Dear Uvaldo Hanna    This letter is to inform you that we received the transmission from your monitor at home that checks your pacemaker and/or defibrillator, or implanted heart monitor. The next date you should transmit will be 12/4/18. Please do not send additional routine transmissions unless specifically requested. Please be aware that the remote device transmission sites are periodically monitored only during regular business hours during which simultaneous in-office device clinics are being run. If your transmission requires attention, we will contact you as soon as possible. Thank you.             [address]

## 2018-08-29 ENCOUNTER — TELEPHONE (OUTPATIENT)
Dept: FAMILY MEDICINE CLINIC | Age: 83
End: 2018-08-29

## 2018-08-31 ENCOUNTER — CARE COORDINATION (OUTPATIENT)
Dept: CASE MANAGEMENT | Age: 83
End: 2018-08-31

## 2018-08-31 ENCOUNTER — OFFICE VISIT (OUTPATIENT)
Dept: CARDIOLOGY CLINIC | Age: 83
End: 2018-08-31

## 2018-08-31 VITALS
WEIGHT: 196 LBS | HEIGHT: 66 IN | RESPIRATION RATE: 16 BRPM | BODY MASS INDEX: 31.5 KG/M2 | SYSTOLIC BLOOD PRESSURE: 138 MMHG | DIASTOLIC BLOOD PRESSURE: 72 MMHG

## 2018-08-31 DIAGNOSIS — R00.1 SYMPTOMATIC BRADYCARDIA: ICD-10-CM

## 2018-08-31 DIAGNOSIS — I35.0 NONRHEUMATIC AORTIC VALVE STENOSIS: Primary | ICD-10-CM

## 2018-08-31 DIAGNOSIS — I10 ESSENTIAL HYPERTENSION: ICD-10-CM

## 2018-08-31 DIAGNOSIS — Z95.0 PACEMAKER: ICD-10-CM

## 2018-08-31 DIAGNOSIS — Z95.2 S/P AVR (AORTIC VALVE REPLACEMENT): ICD-10-CM

## 2018-08-31 DIAGNOSIS — I25.10 CORONARY ARTERY DISEASE INVOLVING NATIVE CORONARY ARTERY OF NATIVE HEART WITHOUT ANGINA PECTORIS: ICD-10-CM

## 2018-08-31 DIAGNOSIS — E78.00 HYPERCHOLESTEREMIA: ICD-10-CM

## 2018-08-31 PROCEDURE — 99214 OFFICE O/P EST MOD 30 MIN: CPT | Performed by: INTERNAL MEDICINE

## 2018-08-31 PROCEDURE — 4040F PNEUMOC VAC/ADMIN/RCVD: CPT | Performed by: INTERNAL MEDICINE

## 2018-08-31 PROCEDURE — 1036F TOBACCO NON-USER: CPT | Performed by: INTERNAL MEDICINE

## 2018-08-31 PROCEDURE — 1101F PT FALLS ASSESS-DOCD LE1/YR: CPT | Performed by: INTERNAL MEDICINE

## 2018-08-31 PROCEDURE — G8417 CALC BMI ABV UP PARAM F/U: HCPCS | Performed by: INTERNAL MEDICINE

## 2018-08-31 PROCEDURE — 1123F ACP DISCUSS/DSCN MKR DOCD: CPT | Performed by: INTERNAL MEDICINE

## 2018-08-31 PROCEDURE — G8599 NO ASA/ANTIPLAT THER USE RNG: HCPCS | Performed by: INTERNAL MEDICINE

## 2018-08-31 PROCEDURE — G8427 DOCREV CUR MEDS BY ELIG CLIN: HCPCS | Performed by: INTERNAL MEDICINE

## 2018-08-31 PROCEDURE — 1111F DSCHRG MED/CURRENT MED MERGE: CPT | Performed by: INTERNAL MEDICINE

## 2018-08-31 NOTE — LETTER
surgery (1966); Carpal tunnel release; tendon manipulation; sinus surgery; Uvulopalatopharygoplasty; Skin cancer excision; Aortic valve replacement (10/2011); Colonoscopy (11/19/12); Abdominal aortic aneurysm repair; Upper gastrointestinal endoscopy (3/9/16); ERCP (4/5/2016); Cardiac surgery; Upper gastrointestinal endoscopy (04/12/2016); and Cardiac pacemaker placement (04/2017). SocHx:   reports that he quit smoking about 40 years ago. He has a 90.00 pack-year smoking history. He has never used smokeless tobacco. He reports that he does not drink alcohol or use drugs. FamHx: family history includes Cancer in his brother and father; Diabetes in his father and mother; Heart Disease in his maternal grandmother and mother; High Blood Pressure in his mother; Stroke in his sister. Allergies: Metoprolol succinate [metoprolol]; Penicillins; Procardia [nifedipine]; Ultram [tramadol hcl]; Aspirin; Nsaids; Tetanus toxoids; and Ninlaro [ixazomib]   ROS:  [x]Full ROS obtained and negative except as mentioned in HPI     MEDICATIONS      Current Outpatient Prescriptions   Medication Sig Dispense Refill    ALPRAZolam (XANAX) 0.25 MG tablet Take 0.25 mg by mouth 3 times daily as needed for Sleep or Anxiety. [place] acetaminophen (TYLENOL) 500 MG tablet Take 1,000 mg by mouth every 6 hours as needed for Pain      albuterol (PROVENTIL) (5 MG/ML) 0.5% nebulizer solution Take 1 mL by nebulization 4 times daily as needed for Wheezing 120 each 3    albuterol sulfate HFA (VENTOLIN HFA) 108 (90 Base) MCG/ACT inhaler Inhale 2 puffs into the lungs every 6 hours as needed for Wheezing 1 Inhaler 11    DiphenhydrAMINE HCl (BENADRYL IJ) 50 mg       predniSONE (DELTASONE) 10 MG tablet Take 1.5 tablets by mouth      Nebulizers (COMPRESSOR/NEBULIZER) MISC Dispense 1 nebulizer 1 each 0    ONE TOUCH ULTRASOFT LANCETS MISC USE AS DIRECTED 200 each 3    ONE TOUCH ULTRA TEST strip TEST AS DIRECTED TWICE DAILY 200 strip 3 Eyes PER, conj/corn clear Ext  Ext nl, AT, no C/C/E   Nose Nares nl, no drain, NT Pulse decr   Throat Lips, mucosa, tongue nl Skin Col/text/turg nl, no vis rash/les   Neck S/S, TM, NT, no bruit/JVD Psych Nl mood and affect   Lung decr bs Lymph   No cervical or axillary LA   Ch wall NT, no deform Neuro  Nl gross M/S exam       ASSESSMENT AND PLAN   ~AS   Date EF Detail   Sx   Chronic sob   Hx 10/11  AVR with 21 mm porcine valve   TTE 8/15  4/17 60%  65% Bio AV well seated MG 18, mild to mod TR  Bio AV well seated MG 14, mild to mod MR   Plan   Continue current meds  No ASA due to plts   ~CAD   Date EF Results   Sx   sob   Hx   No intervention   [facility] 2011   Nonobstructive ([place])   Plan   Poor candidate for intervention and ASA   ~Bradycardia  S/p PPM insert 04/17  ~HTN  Today BP [x] Controlled [] Borderline [] Uncontrolled   Counseling [x] Diet/Salt [x] Exercise [x] Weight    Plan Continue current medications at doses detailed above  ~Hyperchol  Goal LDL [] <100 [x] <70     Counseling [x] Diet [x] Weight [x] Exercise   Lipid/liver Monitor [x] PCP [] Cardio [] Endocrine   Plan Continue current doses of meds listed above  4/17 HDL: 30, LDL: 35  ~PVD  Hx: aortoiliiac stenting 4/10  Plan: Per Dr. Richard Garsia  ~MM  Per HemOnc  ~Followup  Interval: 12 months per patient wishes  Tests/Labs: None    Scribe Attestation  Dale BOOKER, am scribing for and in the presence of Marly Fatima MD.   SignedDale 08/28/18 8:59 AM   Provider Anna Cardoza is working as a scribe for and in the presence of me (Marly Fatima MD). Working as a scribe, Dale Oliveira may have prepopulated components of this note with my historical  intellectual property under my direct supervision. Any additions to this intellectual property were performed in my presence and at my direction. Furthermore, the content and accuracy of this note have been reviewed by me Marly Fatima MD). If you have questions, please do not hesitate to call me. I look forward to following Angela Rhodes along with you.     Sincerely,        Anya Shah MD

## 2018-08-31 NOTE — CARE COORDINATION
Jair 45 Transitions Follow Up Call    2018    Patient: Trini Band  Patient : 1935   MRN: <[id number]>   Reason for Admission: anemia   Discharge Date: 18 RARS: Readmission Risk Score: 16       Spoke with: Perlita 24 Transitions Subsequent and Final Call    Subsequent and Final Calls  Do you have any ongoing symptoms?:  No  Have your medications changed?:  No  Do you have any questions related to your medications?:  No  Do you currently have any active services?:  No  Do you have any needs or concerns that I can assist you with?:  No  Identified Barriers:  None  Care Transitions Interventions  No Identified Needs  Other Interventions:          Summary  CTC spoke to the Pt who states he is feeling \"better\". Pt states he saw cardiologist today and none of his medications were changed and next f/u is in one year. Pt denies any needs or concerns at this time and states he will see his PCP on . CTC provided education on s/s that require medical attention and when to seek medical attention. CTC advised Pt of use urgent care or physicians 24 hr access line if assistance is needed after hours or on the weekend.      Follow Up  Future Appointments  Date Time Provider Anton Tenorio   2018 2:15 PM MD FERNIE Perez [facility]   2018 12:45 PM oFster Charlton MD [facility]   2018 11:45 AM LINUS Thomas PHONE TRANSMISSION  Cardio [facility]       Lexi Antunez RN

## 2018-08-31 NOTE — COMMUNICATION BODY
[address]       H+P // CONSULT // OUTPATIENT VISIT // Karl Quitter VISIT     Referring Doctor Álvaro Arguelles MD   Encounter Type Followup     CHIEF COMPLAINT     VisitType [] Acute [x] Chronic     Symptom [] None [] CP [x] SOB [] Dizzy [] Palps [] Fatigue     Problems AS, CAD, URBANO, HTN, CHOL, PVD, MM      HISTORY OF PRESENT ILLNESS     Doing fair. No cp. SOB persists. Blood levels remain low. Symptom Y N Frequency Duration Severity Modify Assoc Sx Other   CP [] [x]         SOB [x] [] chronic chronic mod +stress -rest     Dizzy [] [x]         Syncope [] [x]         Palpitations [] [x]           COMPLIANCE     Category Meds Diet Salt Exercise Tobacco Alcohol Drugs   Compliant [x] [] [] [] [x] [x] [x]   [x]Counseling given on all above above categories    HISTORY/ALLERGIES/ROS     MedHx:  has a past medical history of Anemia; Anxiety; Arrhythmia; Arthritis; Blood transfusion; CAD (coronary artery disease); Cancer [facility]); CHF (congestive heart failure) ([facility] [address] Chronic back pain; CKD (chronic kidney disease) stage 3, GFR 30-59 ml/min; Diabetes mellitus ([facility] [address] Former cigarette smoker; Hyperlipemia; Hypertension; MDS (myelodysplastic syndrome) ([facility] [address] MDS (myelodysplastic syndrome) ([facility] [address] Multiple myeloma ([facility] [address] and Renal insufficiency. SurgHx:  has a past surgical history that includes joint replacement; back surgery (1966); Carpal tunnel release; tendon manipulation; sinus surgery; Uvulopalatopharygoplasty; Skin cancer excision; Aortic valve replacement (10/2011); Colonoscopy (11/19/12); Abdominal aortic aneurysm repair; Upper gastrointestinal endoscopy (3/9/16); ERCP (4/5/2016); Cardiac surgery; Upper gastrointestinal endoscopy (04/12/2016); and Cardiac pacemaker placement (04/2017). SocHx:   reports that he quit smoking about 40 years ago. He has a 90.00 pack-year smoking history. He has never used smokeless tobacco. He reports that he does not drink alcohol or use drugs.    FamHx: family history

## 2018-09-01 ENCOUNTER — HOSPITAL ENCOUNTER (OUTPATIENT)
Dept: ONCOLOGY | Age: 83
Discharge: HOME | End: 2018-09-01
Attending: NURSE PRACTITIONER | Admitting: NURSE PRACTITIONER

## 2018-09-07 ENCOUNTER — OFFICE VISIT (OUTPATIENT)
Dept: FAMILY MEDICINE CLINIC | Age: 83
End: 2018-09-07

## 2018-09-07 VITALS
OXYGEN SATURATION: 96 % | DIASTOLIC BLOOD PRESSURE: 60 MMHG | SYSTOLIC BLOOD PRESSURE: 130 MMHG | HEART RATE: 87 BPM | BODY MASS INDEX: 31.89 KG/M2 | WEIGHT: 197.6 LBS

## 2018-09-07 DIAGNOSIS — N18.30 TYPE 2 DIABETES MELLITUS WITH STAGE 3 CHRONIC KIDNEY DISEASE, WITHOUT LONG-TERM CURRENT USE OF INSULIN (HCC): ICD-10-CM

## 2018-09-07 DIAGNOSIS — I10 ESSENTIAL HYPERTENSION: Chronic | ICD-10-CM

## 2018-09-07 DIAGNOSIS — D61.810 ANTINEOPLASTIC CHEMOTHERAPY INDUCED PANCYTOPENIA (HCC): ICD-10-CM

## 2018-09-07 DIAGNOSIS — D69.6 THROMBOCYTOPENIA (HCC): ICD-10-CM

## 2018-09-07 DIAGNOSIS — D46.9 MDS (MYELODYSPLASTIC SYNDROME) (HCC): ICD-10-CM

## 2018-09-07 DIAGNOSIS — E11.22 TYPE 2 DIABETES MELLITUS WITH STAGE 3 CHRONIC KIDNEY DISEASE, WITHOUT LONG-TERM CURRENT USE OF INSULIN (HCC): ICD-10-CM

## 2018-09-07 DIAGNOSIS — T45.1X5A ANTINEOPLASTIC CHEMOTHERAPY INDUCED PANCYTOPENIA (HCC): ICD-10-CM

## 2018-09-07 DIAGNOSIS — Z09 HOSPITAL DISCHARGE FOLLOW-UP: Primary | ICD-10-CM

## 2018-09-07 PROCEDURE — 1111F DSCHRG MED/CURRENT MED MERGE: CPT | Performed by: FAMILY MEDICINE

## 2018-09-07 PROCEDURE — 4040F PNEUMOC VAC/ADMIN/RCVD: CPT | Performed by: FAMILY MEDICINE

## 2018-09-07 PROCEDURE — 1036F TOBACCO NON-USER: CPT | Performed by: FAMILY MEDICINE

## 2018-09-07 PROCEDURE — 1101F PT FALLS ASSESS-DOCD LE1/YR: CPT | Performed by: FAMILY MEDICINE

## 2018-09-07 PROCEDURE — 99214 OFFICE O/P EST MOD 30 MIN: CPT | Performed by: FAMILY MEDICINE

## 2018-09-07 PROCEDURE — 1123F ACP DISCUSS/DSCN MKR DOCD: CPT | Performed by: FAMILY MEDICINE

## 2018-09-07 PROCEDURE — G8427 DOCREV CUR MEDS BY ELIG CLIN: HCPCS | Performed by: FAMILY MEDICINE

## 2018-09-07 PROCEDURE — G8417 CALC BMI ABV UP PARAM F/U: HCPCS | Performed by: FAMILY MEDICINE

## 2018-09-07 PROCEDURE — G8599 NO ASA/ANTIPLAT THER USE RNG: HCPCS | Performed by: FAMILY MEDICINE

## 2018-09-07 RX ORDER — AZACITIDINE 100 MG/1
75 INJECTION, POWDER, LYOPHILIZED, FOR SOLUTION INTRAVENOUS; SUBCUTANEOUS EVERY 24 HOURS
Status: ON HOLD | COMMUNITY
End: 2018-09-24 | Stop reason: HOSPADM

## 2018-09-07 RX ORDER — ALPRAZOLAM 1 MG/1
TABLET ORAL
Status: ON HOLD | COMMUNITY
End: 2018-09-24

## 2018-09-07 ASSESSMENT — PATIENT HEALTH QUESTIONNAIRE - PHQ9
SUM OF ALL RESPONSES TO PHQ QUESTIONS 1-9: 0
2. FEELING DOWN, DEPRESSED OR HOPELESS: 0
SUM OF ALL RESPONSES TO PHQ9 QUESTIONS 1 & 2: 0
SUM OF ALL RESPONSES TO PHQ QUESTIONS 1-9: 0
1. LITTLE INTEREST OR PLEASURE IN DOING THINGS: 0

## 2018-09-07 ASSESSMENT — ENCOUNTER SYMPTOMS
GASTROINTESTINAL NEGATIVE: 1
CONSTIPATION: 0
SHORTNESS OF BREATH: 1

## 2018-09-07 NOTE — PATIENT INSTRUCTIONS
pressure numbers will appear on the screen. · Write your numbers in your log book, along with the date and time. Manual blood pressure monitors  · Place the earpieces of a stethoscope in your ears, and place the bell of the stethoscope over the artery, just below the cuff. · Close the valve on the rubber inflating bulb. · Squeeze the bulb rapidly with your opposite hand to inflate the cuff until the dial or column of mercury reads about 30 mm Hg higher than your usual systolic pressure. If you do not know your usual pressure, inflate the cuff to 210 mm Hg or until the pulse at your wrist disappears. · Open the pressure valve just slightly by twisting or pressing the valve on the bulb. · As you watch the pressure slowly fall, note the level on the dial at which you first start to hear a pulsing or tapping sound through the stethoscope. This is your systolic blood pressure. · Continue letting the air out slowly. The sounds will become muffled and will finally disappear. Note the pressure when the sounds completely disappear. This is your diastolic blood pressure. Let out all the remaining air. · Write your numbers in your log book, along with the date and time. What else should you know about the test?  Here are the categories of blood pressure for adults:  Ideal blood pressure. Systolic is less than 861, and diastolic is less than 80. Elevated blood pressure. Systolic is 334 to 586, and diastolic is less than 80. High blood pressure (hypertension). Systolic is 550 or above. Diastolic is 80 or above. One or both numbers may be high. It is more accurate to take the average of several readings made throughout the day than to rely on a single reading. Follow-up care is a key part of your treatment and safety. Be sure to make and go to all appointments, and call your doctor if you are having problems. It's also a good idea to keep a list of the medicines you take. Where can you learn more?   Go to

## 2018-09-10 ENCOUNTER — TELEPHONE (OUTPATIENT)
Dept: OTHER | Facility: CLINIC | Age: 83
End: 2018-09-10

## 2018-09-10 PROBLEM — J18.9 PNEUMONIA: Status: ACTIVE | Noted: 2018-09-10

## 2018-09-19 ENCOUNTER — CARE COORDINATION (OUTPATIENT)
Dept: CASE MANAGEMENT | Age: 83
End: 2018-09-19

## 2018-09-19 DIAGNOSIS — J15.211 PNEUMONIA OF BOTH LUNGS DUE TO METHICILLIN SUSCEPTIBLE STAPHYLOCOCCUS AUREUS (MSSA), UNSPECIFIED PART OF LUNG (HCC): Primary | ICD-10-CM

## 2018-09-20 ENCOUNTER — OFFICE VISIT (OUTPATIENT)
Dept: FAMILY MEDICINE CLINIC | Age: 83
End: 2018-09-20

## 2018-09-20 VITALS
WEIGHT: 184 LBS | SYSTOLIC BLOOD PRESSURE: 138 MMHG | OXYGEN SATURATION: 93 % | HEART RATE: 96 BPM | BODY MASS INDEX: 29.7 KG/M2 | DIASTOLIC BLOOD PRESSURE: 80 MMHG

## 2018-09-20 DIAGNOSIS — Z12.5 SCREENING FOR PROSTATE CANCER: ICD-10-CM

## 2018-09-20 DIAGNOSIS — D63.1 ANEMIA OF CHRONIC RENAL FAILURE, STAGE 3 (MODERATE) (HCC): ICD-10-CM

## 2018-09-20 DIAGNOSIS — N18.30 TYPE 2 DIABETES MELLITUS WITH STAGE 3 CHRONIC KIDNEY DISEASE, WITHOUT LONG-TERM CURRENT USE OF INSULIN (HCC): ICD-10-CM

## 2018-09-20 DIAGNOSIS — J15.211 PNEUMONIA OF BOTH LUNGS DUE TO METHICILLIN SUSCEPTIBLE STAPHYLOCOCCUS AUREUS (MSSA), UNSPECIFIED PART OF LUNG (HCC): Primary | ICD-10-CM

## 2018-09-20 DIAGNOSIS — N18.30 CKD (CHRONIC KIDNEY DISEASE) STAGE 3, GFR 30-59 ML/MIN (HCC): ICD-10-CM

## 2018-09-20 DIAGNOSIS — R06.02 SOB (SHORTNESS OF BREATH): ICD-10-CM

## 2018-09-20 DIAGNOSIS — R39.15 URINARY URGENCY: ICD-10-CM

## 2018-09-20 DIAGNOSIS — Z09 HOSPITAL DISCHARGE FOLLOW-UP: ICD-10-CM

## 2018-09-20 DIAGNOSIS — E11.22 TYPE 2 DIABETES MELLITUS WITH STAGE 3 CHRONIC KIDNEY DISEASE, WITHOUT LONG-TERM CURRENT USE OF INSULIN (HCC): ICD-10-CM

## 2018-09-20 DIAGNOSIS — I50.32 CHRONIC DIASTOLIC HEART FAILURE (HCC): ICD-10-CM

## 2018-09-20 DIAGNOSIS — A04.72 C. DIFFICILE DIARRHEA: ICD-10-CM

## 2018-09-20 DIAGNOSIS — N18.30 ANEMIA OF CHRONIC RENAL FAILURE, STAGE 3 (MODERATE) (HCC): ICD-10-CM

## 2018-09-20 DIAGNOSIS — J44.1 COPD EXACERBATION (HCC): ICD-10-CM

## 2018-09-20 DIAGNOSIS — D46.9 MDS (MYELODYSPLASTIC SYNDROME) (HCC): ICD-10-CM

## 2018-09-20 LAB
ANION GAP SERPL CALCULATED.3IONS-SCNC: 19 MMOL/L (ref 3–16)
BUN BLDV-MCNC: 38 MG/DL (ref 7–20)
CALCIUM SERPL-MCNC: 9 MG/DL (ref 8.3–10.6)
CHLORIDE BLD-SCNC: 86 MMOL/L (ref 99–110)
CO2: 30 MMOL/L (ref 21–32)
CREAT SERPL-MCNC: 1.7 MG/DL (ref 0.8–1.3)
GFR AFRICAN AMERICAN: 47
GFR NON-AFRICAN AMERICAN: 39
GLUCOSE BLD-MCNC: 157 MG/DL (ref 70–99)
POTASSIUM SERPL-SCNC: 4.6 MMOL/L (ref 3.5–5.1)
PROSTATE SPECIFIC ANTIGEN: 0.04 NG/ML (ref 0–4)
SODIUM BLD-SCNC: 135 MMOL/L (ref 136–145)

## 2018-09-20 PROCEDURE — 1111F DSCHRG MED/CURRENT MED MERGE: CPT | Performed by: NURSE PRACTITIONER

## 2018-09-20 PROCEDURE — 99496 TRANSJ CARE MGMT HIGH F2F 7D: CPT | Performed by: NURSE PRACTITIONER

## 2018-09-20 ASSESSMENT — ENCOUNTER SYMPTOMS
CHEST TIGHTNESS: 0
SHORTNESS OF BREATH: 0
COUGH: 0

## 2018-09-20 NOTE — PATIENT INSTRUCTIONS
symptoms go away. ¨ Avoid chewing gum that contains sorbitol. ¨ Avoid dairy products (except for yogurt with lactobacillus) while you have diarrhea and for 3 days after symptoms go away. · To prevent the spread of C. difficile, practice good hygiene. Keep your hands clean by washing them well and often with soap and clean, running water. Alcohol-based hand sanitizers do not kill C. difficile. When should you call for help? Call 911 if:    · You passed out (lost consciousness).    Call your doctor now or seek immediate medical care if:    · You have a fever over 101°F or shaking chills.     · You feel lightheaded or have a fast heart rate.     · You pass stools that are almost always bloody.     · You have signs of needing more fluids. You have sunken eyes and a dry mouth, and you pass only a little dark urine.     · You have severe belly pain with or without bloating.     · You have severe vomiting and cannot keep down liquids.     · You are not passing any stools or gas.    Watch closely for changes in your health, and be sure to contact your doctor if:    · You do not get better as expected. Where can you learn more? Go to https://[employer]OPRAGEOUSeb.[facility]. org and sign in to your [employer] account. Enter [phone] in the [facility] box to learn more about \"Clostridium Difficile Colitis: Care Instructions. \"     If you do not have an account, please click on the \"Sign Up Now\" link. Current as of: November 18, 2017  Content Version: 11.7  © 5467-5660 [employer]. Care instructions adapted under license by [facility] ([facility]). If you have questions about a medical condition or this instruction, always ask your healthcare professional. [address] any warranty or liability for your use of this information.

## 2018-09-20 NOTE — PROGRESS NOTES
CXR    Acute pulmonary edema (HCC)       Allergies   Allergen Reactions    Metoprolol Succinate [Metoprolol] Shortness Of Breath and Palpitations    Penicillins Hives, Shortness Of Breath and Swelling    Procardia [Nifedipine] Anaphylaxis    Ultram [Tramadol Hcl] Other (See Comments)     Feels bad , upset stomach    Aspirin      Burning stomach    Nsaids Other (See Comments)     \"MY STOMACH BURNS\"    Tetanus Toxoids Other (See Comments)     ARM SWELLED    Ninlaro [Ixazomib] Rash       Medications listed as ordered at the time of discharge from hospital     Saqibtali Rik ORION   Home Medication Instructions PB:    Printed on:09/20/18 7560   Medication Information                      acetaminophen (TYLENOL) 500 MG tablet  Take 1,000 mg by mouth every 6 hours as needed for Pain             albuterol (PROVENTIL) (5 MG/ML) 0.5% nebulizer solution  Take 1 mL by nebulization 4 times daily as needed for Wheezing             albuterol sulfate HFA (VENTOLIN HFA) 108 (90 Base) MCG/ACT inhaler  Inhale 2 puffs into the lungs every 6 hours as needed for Wheezing             ALPRAZolam (XANAX) 1 MG tablet  alprazolam 1 mg tablet   TK 1 T PO  TID PRN             azaCITIDine (VIDAZA) 100 MG chemo injection  Inject 75 mg/m2 into the muscle every 24 hours             Calcium Carbonate-Vitamin D (CALCIUM 600-D PO)  Take 600 mg by mouth daily.                DiphenhydrAMINE HCl (BENADRYL IJ)  50 mg              docusate sodium (COLACE) 100 MG capsule  Take 100 mg by mouth as needed              doxycycline hyclate (VIBRAMYCIN) 100 MG capsule  Take 1 capsule by mouth 2 times daily for 7 days             Elastic Bandages & Supports (V-2 HIGH COMPRESSION HOSE) MISC  10-20 mm Compression Hose             Elotuzumab (EMPLICITI IV)  Infuse intravenously             epoetin german (PROCRIT) 22877 UNIT/ML injection  Inject 60,000 Units into the skin Every two weeks as directed              Famotidine (PEPCID IV)  Infuse intravenously Fiber TABS  Take 1 capsule by mouth daily. fluticasone (FLONASE) 50 MCG/ACT nasal spray  2 sprays by Nasal route daily             furosemide (LASIX) 20 MG tablet  Take 1 tablet by mouth 2 times daily             guaiFENesin 400 MG tablet  Take 400 mg by mouth 2 times daily             METHYLPREDNISOLONE SODIUM SUCC IJ  Inject as directed             Multiple Vitamin (MULTIVITAMIN PO)  Take 1 tablet by mouth daily.                Nebulizers (COMPRESSOR/NEBULIZER) MISC  Dispense 1 nebulizer             olopatadine (PATANOL) 0.1 % ophthalmic solution  INSTILL 1 DROP IN BOTH EYES TWICE DAILY             ONE TOUCH ULTRA TEST strip  TEST AS DIRECTED TWICE DAILY             ONE TOUCH ULTRASOFT LANCETS MISC  USE AS DIRECTED             oxyCODONE 5 MG capsule  Take 1 capsule by mouth every 4 hours as needed for Pain             OXYGEN  Inhale into the lungs 3 liters continously             phenylephrine-mineral oil-petrolatum (PREPARATION H) 0.25-14-74.9 % rectal ointment  Place rectally 2 times daily as needed for Hemorrhoids             polyethylene glycol (GLYCOLAX) powder  Take 17 g by mouth daily as needed              predniSONE (DELTASONE) 10 MG tablet  Take 1.5 tablets by mouth              promethazine (PHENERGAN) 25 MG tablet  Take 0.5-1 tablets by mouth every 6 hours as needed for Nausea             ranitidine (ZANTAC) 150 MG tablet  Take 150 mg by mouth 2 times daily              tamsulosin (FLOMAX) 0.4 MG capsule  Take 1 capsule by mouth daily             valACYclovir (VALTREX) 500 MG tablet  Take 1 tablet by mouth 2 times daily Starting 1 week after first infusion             vancomycin (FIRVANQ) 50 MG/ML SOLR oral solution  Take 2.5 mLs by mouth every 8 hours for 10 days                   Medications marked \"taking\" at this time  Outpatient Prescriptions Marked as Taking for the 9/20/18 encounter (Office Visit) with LUCINA Penny CNP   Medication Sig Dispense Refill    doxycycline hyclate (VIBRAMYCIN) 100 MG capsule Take 1 capsule by mouth 2 times daily for 7 days 14 capsule 0    furosemide (LASIX) 20 MG tablet Take 1 tablet by mouth 2 times daily 60 tablet 3    vancomycin (FIRVANQ) 50 MG/ML SOLR oral solution Take 2.5 mLs by mouth every 8 hours for 10 days 105 mL 0    tamsulosin (FLOMAX) 0.4 MG capsule Take 1 capsule by mouth daily 30 capsule 3    phenylephrine-mineral oil-petrolatum (PREPARATION H) 0.25-14-74.9 % rectal ointment Place rectally 2 times daily as needed for Hemorrhoids      ALPRAZolam (XANAX) 1 MG tablet alprazolam 1 mg tablet   TK 1 T PO  TID PRN      Elotuzumab (EMPLICITI IV) Infuse intravenously      METHYLPREDNISOLONE SODIUM SUCC IJ Inject as directed      Famotidine (PEPCID IV) Infuse intravenously      azaCITIDine (VIDAZA) 100 MG chemo injection Inject 75 mg/m2 into the muscle every 24 hours      acetaminophen (TYLENOL) 500 MG tablet Take 1,000 mg by mouth every 6 hours as needed for Pain      albuterol (PROVENTIL) (5 MG/ML) 0.5% nebulizer solution Take 1 mL by nebulization 4 times daily as needed for Wheezing 120 each 3    albuterol sulfate HFA (VENTOLIN HFA) 108 (90 Base) MCG/ACT inhaler Inhale 2 puffs into the lungs every 6 hours as needed for Wheezing 1 Inhaler 11    DiphenhydrAMINE HCl (BENADRYL IJ) 50 mg       predniSONE (DELTASONE) 10 MG tablet Take 1.5 tablets by mouth       Nebulizers (COMPRESSOR/NEBULIZER) MISC Dispense 1 nebulizer 1 each 0    ONE TOUCH ULTRASOFT LANCETS MISC USE AS DIRECTED 200 each 3    ONE TOUCH ULTRA TEST strip TEST AS DIRECTED TWICE DAILY 200 strip 3    olopatadine (PATANOL) 0.1 % ophthalmic solution INSTILL 1 DROP IN BOTH EYES TWICE DAILY 3 Bottle 3    valACYclovir (VALTREX) 500 MG tablet Take 1 tablet by mouth 2 times daily Starting 1 week after first infusion 60 tablet 6    promethazine (PHENERGAN) 25 MG tablet Take 0.5-1 tablets by mouth every 6 hours as needed for Nausea 30 tablet 0    oxyCODONE 5 MG capsule Take

## 2018-09-21 ENCOUNTER — TELEPHONE (OUTPATIENT)
Dept: FAMILY MEDICINE CLINIC | Age: 83
End: 2018-09-21

## 2018-09-21 DIAGNOSIS — R39.15 URINARY URGENCY: ICD-10-CM

## 2018-09-21 LAB
BILIRUBIN URINE: NEGATIVE
BLOOD, URINE: NEGATIVE
CLARITY: CLEAR
COLOR: YELLOW
EPITHELIAL CELLS, UA: 0 /HPF (ref 0–5)
GLUCOSE URINE: NEGATIVE MG/DL
HYALINE CASTS: 1 /LPF (ref 0–8)
KETONES, URINE: NEGATIVE MG/DL
LEUKOCYTE ESTERASE, URINE: NEGATIVE
MICROSCOPIC EXAMINATION: YES
NITRITE, URINE: NEGATIVE
PH UA: 6.5
PROTEIN UA: 30 MG/DL
RBC UA: 1 /HPF (ref 0–4)
SPECIFIC GRAVITY UA: 1.01
URINE TYPE: ABNORMAL
UROBILINOGEN, URINE: 0.2 E.U./DL
WBC UA: 0 /HPF (ref 0–5)

## 2018-09-23 ENCOUNTER — APPOINTMENT (OUTPATIENT)
Dept: GENERAL RADIOLOGY | Age: 83
DRG: 871 | End: 2018-09-23
Payer: MEDICARE

## 2018-09-23 ENCOUNTER — HOSPITAL ENCOUNTER (INPATIENT)
Age: 83
LOS: 1 days | Discharge: HOSPICE | DRG: 871 | End: 2018-09-24
Attending: EMERGENCY MEDICINE | Admitting: FAMILY MEDICINE
Payer: MEDICARE

## 2018-09-23 DIAGNOSIS — R77.8 ELEVATED TROPONIN: ICD-10-CM

## 2018-09-23 DIAGNOSIS — Z51.5 COMFORT MEASURES ONLY STATUS: ICD-10-CM

## 2018-09-23 DIAGNOSIS — J18.9 PNEUMONIA DUE TO ORGANISM: Primary | ICD-10-CM

## 2018-09-23 DIAGNOSIS — A41.9 SEPTICEMIA (HCC): ICD-10-CM

## 2018-09-23 LAB
A/G RATIO: 0.6 (ref 1.1–2.2)
ALBUMIN SERPL-MCNC: 3.3 G/DL (ref 3.4–5)
ALP BLD-CCNC: 64 U/L (ref 40–129)
ALT SERPL-CCNC: 14 U/L (ref 10–40)
ANION GAP SERPL CALCULATED.3IONS-SCNC: 15 MMOL/L (ref 3–16)
AST SERPL-CCNC: 22 U/L (ref 15–37)
BILIRUB SERPL-MCNC: 1 MG/DL (ref 0–1)
BILIRUBIN URINE: NEGATIVE
BLOOD, URINE: ABNORMAL
BUN BLDV-MCNC: 34 MG/DL (ref 7–20)
C DIFFICILE TOXIN, EIA: NORMAL
CALCIUM SERPL-MCNC: 9.4 MG/DL (ref 8.3–10.6)
CHLORIDE BLD-SCNC: 88 MMOL/L (ref 99–110)
CLARITY: ABNORMAL
CO2: 33 MMOL/L (ref 21–32)
COLOR: YELLOW
CREAT SERPL-MCNC: 1.9 MG/DL (ref 0.8–1.3)
EPITHELIAL CELLS, UA: 1 /HPF (ref 0–5)
GFR AFRICAN AMERICAN: 41
GFR NON-AFRICAN AMERICAN: 34
GLOBULIN: 5.1 G/DL
GLUCOSE BLD-MCNC: 123 MG/DL (ref 70–99)
GLUCOSE URINE: NEGATIVE MG/DL
HCT VFR BLD CALC: 26.1 % (ref 40.5–52.5)
HEMATOLOGY PATH CONSULT: YES
HEMOGLOBIN: 8.4 G/DL (ref 13.5–17.5)
HYALINE CASTS: 5 /LPF (ref 0–8)
KETONES, URINE: NEGATIVE MG/DL
LACTIC ACID: 1.9 MMOL/L (ref 0.4–2)
LACTIC ACID: 1.9 MMOL/L (ref 0.4–2)
LEUKOCYTE ESTERASE, URINE: NEGATIVE
MCH RBC QN AUTO: 28.6 PG (ref 26–34)
MCHC RBC AUTO-ENTMCNC: 32.3 G/DL (ref 31–36)
MCV RBC AUTO: 88.7 FL (ref 80–100)
MICROSCOPIC EXAMINATION: YES
NITRITE, URINE: NEGATIVE
PDW BLD-RTO: 21.6 % (ref 12.4–15.4)
PH UA: 5
PLATELET # BLD: 22 K/UL (ref 135–450)
PLATELET SLIDE REVIEW: ABNORMAL
PMV BLD AUTO: 6.6 FL (ref 5–10.5)
POTASSIUM SERPL-SCNC: 4.2 MMOL/L (ref 3.5–5.1)
PRO-BNP: 1059 PG/ML (ref 0–449)
PROCALCITONIN: 0.28 NG/ML (ref 0–0.15)
PROTEIN UA: 30 MG/DL
RBC # BLD: 2.94 M/UL (ref 4.2–5.9)
RBC UA: 4 /HPF (ref 0–4)
SODIUM BLD-SCNC: 136 MMOL/L (ref 136–145)
SPECIFIC GRAVITY UA: 1.01
TOTAL PROTEIN: 8.4 G/DL (ref 6.4–8.2)
TROPONIN: 0.02 NG/ML
URINE REFLEX TO CULTURE: ABNORMAL
URINE TYPE: ABNORMAL
UROBILINOGEN, URINE: 0.2 E.U./DL
WBC # BLD: 10.1 K/UL (ref 4–11)
WBC UA: 1 /HPF (ref 0–5)

## 2018-09-23 PROCEDURE — 81001 URINALYSIS AUTO W/SCOPE: CPT

## 2018-09-23 PROCEDURE — 94664 DEMO&/EVAL PT USE INHALER: CPT

## 2018-09-23 PROCEDURE — 6370000000 HC RX 637 (ALT 250 FOR IP): Performed by: PHYSICIAN ASSISTANT

## 2018-09-23 PROCEDURE — 85027 COMPLETE CBC AUTOMATED: CPT

## 2018-09-23 PROCEDURE — 87070 CULTURE OTHR SPECIMN AEROBIC: CPT

## 2018-09-23 PROCEDURE — 87040 BLOOD CULTURE FOR BACTERIA: CPT

## 2018-09-23 PROCEDURE — 2580000003 HC RX 258: Performed by: INTERNAL MEDICINE

## 2018-09-23 PROCEDURE — 80053 COMPREHEN METABOLIC PANEL: CPT

## 2018-09-23 PROCEDURE — 2700000000 HC OXYGEN THERAPY PER DAY

## 2018-09-23 PROCEDURE — 6360000002 HC RX W HCPCS: Performed by: FAMILY MEDICINE

## 2018-09-23 PROCEDURE — 6360000002 HC RX W HCPCS: Performed by: HOSPITALIST

## 2018-09-23 PROCEDURE — 6370000000 HC RX 637 (ALT 250 FOR IP): Performed by: FAMILY MEDICINE

## 2018-09-23 PROCEDURE — 99223 1ST HOSP IP/OBS HIGH 75: CPT | Performed by: INTERNAL MEDICINE

## 2018-09-23 PROCEDURE — 71046 X-RAY EXAM CHEST 2 VIEWS: CPT

## 2018-09-23 PROCEDURE — 96367 TX/PROPH/DG ADDL SEQ IV INF: CPT

## 2018-09-23 PROCEDURE — 2580000003 HC RX 258: Performed by: PHYSICIAN ASSISTANT

## 2018-09-23 PROCEDURE — 83605 ASSAY OF LACTIC ACID: CPT

## 2018-09-23 PROCEDURE — 99285 EMERGENCY DEPT VISIT HI MDM: CPT

## 2018-09-23 PROCEDURE — 2000000000 HC ICU R&B

## 2018-09-23 PROCEDURE — 87186 SC STD MICRODIL/AGAR DIL: CPT

## 2018-09-23 PROCEDURE — 96365 THER/PROPH/DIAG IV INF INIT: CPT

## 2018-09-23 PROCEDURE — 87324 CLOSTRIDIUM AG IA: CPT

## 2018-09-23 PROCEDURE — 94760 N-INVAS EAR/PLS OXIMETRY 1: CPT

## 2018-09-23 PROCEDURE — 87077 CULTURE AEROBIC IDENTIFY: CPT

## 2018-09-23 PROCEDURE — 93010 ELECTROCARDIOGRAM REPORT: CPT | Performed by: INTERNAL MEDICINE

## 2018-09-23 PROCEDURE — 84484 ASSAY OF TROPONIN QUANT: CPT

## 2018-09-23 PROCEDURE — 36415 COLL VENOUS BLD VENIPUNCTURE: CPT

## 2018-09-23 PROCEDURE — 2580000003 HC RX 258

## 2018-09-23 PROCEDURE — 83880 ASSAY OF NATRIURETIC PEPTIDE: CPT

## 2018-09-23 PROCEDURE — 2580000003 HC RX 258: Performed by: HOSPITALIST

## 2018-09-23 PROCEDURE — 6360000002 HC RX W HCPCS: Performed by: PHYSICIAN ASSISTANT

## 2018-09-23 PROCEDURE — 87205 SMEAR GRAM STAIN: CPT

## 2018-09-23 PROCEDURE — 2580000003 HC RX 258: Performed by: FAMILY MEDICINE

## 2018-09-23 PROCEDURE — 87449 NOS EACH ORGANISM AG IA: CPT

## 2018-09-23 PROCEDURE — 94640 AIRWAY INHALATION TREATMENT: CPT

## 2018-09-23 PROCEDURE — 93005 ELECTROCARDIOGRAM TRACING: CPT | Performed by: PHYSICIAN ASSISTANT

## 2018-09-23 PROCEDURE — 94761 N-INVAS EAR/PLS OXIMETRY MLT: CPT

## 2018-09-23 PROCEDURE — 84145 PROCALCITONIN (PCT): CPT

## 2018-09-23 PROCEDURE — 36591 DRAW BLOOD OFF VENOUS DEVICE: CPT

## 2018-09-23 RX ORDER — B-COMPLEX WITH VITAMIN C
1 TABLET ORAL
Status: DISCONTINUED | OUTPATIENT
Start: 2018-09-23 | End: 2018-09-24 | Stop reason: HOSPADM

## 2018-09-23 RX ORDER — OLOPATADINE HYDROCHLORIDE 1 MG/ML
1 SOLUTION/ DROPS OPHTHALMIC 2 TIMES DAILY
Status: DISCONTINUED | OUTPATIENT
Start: 2018-09-23 | End: 2018-09-24 | Stop reason: HOSPADM

## 2018-09-23 RX ORDER — GUAIFENESIN 400 MG/1
400 TABLET ORAL 2 TIMES DAILY
Status: DISCONTINUED | OUTPATIENT
Start: 2018-09-23 | End: 2018-09-23 | Stop reason: CLARIF

## 2018-09-23 RX ORDER — FLUTICASONE PROPIONATE 50 MCG
2 SPRAY, SUSPENSION (ML) NASAL DAILY
Status: DISCONTINUED | OUTPATIENT
Start: 2018-09-23 | End: 2018-09-24 | Stop reason: HOSPADM

## 2018-09-23 RX ORDER — SODIUM CHLORIDE 9 MG/ML
INJECTION, SOLUTION INTRAVENOUS CONTINUOUS
Status: DISCONTINUED | OUTPATIENT
Start: 2018-09-23 | End: 2018-09-24 | Stop reason: HOSPADM

## 2018-09-23 RX ORDER — ALBUTEROL SULFATE 2.5 MG/3ML
5 SOLUTION RESPIRATORY (INHALATION) 4 TIMES DAILY PRN
Status: DISCONTINUED | OUTPATIENT
Start: 2018-09-23 | End: 2018-09-24 | Stop reason: HOSPADM

## 2018-09-23 RX ORDER — FUROSEMIDE 20 MG/1
20 TABLET ORAL 2 TIMES DAILY
Status: DISCONTINUED | OUTPATIENT
Start: 2018-09-23 | End: 2018-09-23

## 2018-09-23 RX ORDER — SODIUM CHLORIDE 9 MG/ML
INJECTION, SOLUTION INTRAVENOUS
Status: COMPLETED
Start: 2018-09-23 | End: 2018-09-23

## 2018-09-23 RX ORDER — ACETAMINOPHEN 325 MG/1
650 TABLET ORAL ONCE
Status: COMPLETED | OUTPATIENT
Start: 2018-09-23 | End: 2018-09-23

## 2018-09-23 RX ORDER — GUAIFENESIN 600 MG/1
600 TABLET, EXTENDED RELEASE ORAL 2 TIMES DAILY
Status: DISCONTINUED | OUTPATIENT
Start: 2018-09-23 | End: 2018-09-24 | Stop reason: HOSPADM

## 2018-09-23 RX ORDER — ONDANSETRON 2 MG/ML
4 INJECTION INTRAMUSCULAR; INTRAVENOUS EVERY 6 HOURS PRN
Status: DISCONTINUED | OUTPATIENT
Start: 2018-09-23 | End: 2018-09-24 | Stop reason: HOSPADM

## 2018-09-23 RX ORDER — DOXYCYCLINE HYCLATE 100 MG
100 TABLET ORAL 2 TIMES DAILY
Status: DISCONTINUED | OUTPATIENT
Start: 2018-09-23 | End: 2018-09-23

## 2018-09-23 RX ORDER — SODIUM CHLORIDE 0.9 % (FLUSH) 0.9 %
10 SYRINGE (ML) INJECTION EVERY 12 HOURS SCHEDULED
Status: DISCONTINUED | OUTPATIENT
Start: 2018-09-23 | End: 2018-09-24 | Stop reason: HOSPADM

## 2018-09-23 RX ORDER — OXYCODONE HYDROCHLORIDE 5 MG/1
5 TABLET ORAL EVERY 4 HOURS PRN
Status: DISCONTINUED | OUTPATIENT
Start: 2018-09-23 | End: 2018-09-24 | Stop reason: HOSPADM

## 2018-09-23 RX ORDER — 0.9 % SODIUM CHLORIDE 0.9 %
30 INTRAVENOUS SOLUTION INTRAVENOUS ONCE
Status: COMPLETED | OUTPATIENT
Start: 2018-09-23 | End: 2018-09-23

## 2018-09-23 RX ORDER — ACETAMINOPHEN 500 MG
1000 TABLET ORAL EVERY 8 HOURS PRN
Status: DISCONTINUED | OUTPATIENT
Start: 2018-09-23 | End: 2018-09-24 | Stop reason: HOSPADM

## 2018-09-23 RX ORDER — FAMOTIDINE 20 MG/1
20 TABLET, FILM COATED ORAL DAILY
Status: DISCONTINUED | OUTPATIENT
Start: 2018-09-23 | End: 2018-09-24 | Stop reason: HOSPADM

## 2018-09-23 RX ORDER — CALCIUM POLYCARBOPHIL 625 MG 625 MG/1
625 TABLET ORAL DAILY
Status: DISCONTINUED | OUTPATIENT
Start: 2018-09-23 | End: 2018-09-24 | Stop reason: HOSPADM

## 2018-09-23 RX ORDER — IPRATROPIUM BROMIDE AND ALBUTEROL SULFATE 2.5; .5 MG/3ML; MG/3ML
1 SOLUTION RESPIRATORY (INHALATION) ONCE
Status: COMPLETED | OUTPATIENT
Start: 2018-09-23 | End: 2018-09-23

## 2018-09-23 RX ORDER — 0.9 % SODIUM CHLORIDE 0.9 %
500 INTRAVENOUS SOLUTION INTRAVENOUS ONCE
Status: COMPLETED | OUTPATIENT
Start: 2018-09-23 | End: 2018-09-23

## 2018-09-23 RX ORDER — POLYETHYLENE GLYCOL 3350 17 G/17G
17 POWDER, FOR SOLUTION ORAL DAILY PRN
Status: DISCONTINUED | OUTPATIENT
Start: 2018-09-23 | End: 2018-09-24 | Stop reason: HOSPADM

## 2018-09-23 RX ORDER — ALPRAZOLAM 0.5 MG/1
1 TABLET ORAL NIGHTLY PRN
Status: DISCONTINUED | OUTPATIENT
Start: 2018-09-23 | End: 2018-09-24 | Stop reason: HOSPADM

## 2018-09-23 RX ORDER — FAMOTIDINE 20 MG/1
20 TABLET, FILM COATED ORAL 2 TIMES DAILY
Status: DISCONTINUED | OUTPATIENT
Start: 2018-09-23 | End: 2018-09-23 | Stop reason: DRUGHIGH

## 2018-09-23 RX ORDER — TAMSULOSIN HYDROCHLORIDE 0.4 MG/1
0.4 CAPSULE ORAL DAILY
Status: DISCONTINUED | OUTPATIENT
Start: 2018-09-23 | End: 2018-09-24 | Stop reason: HOSPADM

## 2018-09-23 RX ORDER — SODIUM CHLORIDE 0.9 % (FLUSH) 0.9 %
10 SYRINGE (ML) INJECTION PRN
Status: DISCONTINUED | OUTPATIENT
Start: 2018-09-23 | End: 2018-09-24 | Stop reason: HOSPADM

## 2018-09-23 RX ORDER — ALBUTEROL SULFATE 90 UG/1
2 AEROSOL, METERED RESPIRATORY (INHALATION) EVERY 6 HOURS PRN
Status: DISCONTINUED | OUTPATIENT
Start: 2018-09-23 | End: 2018-09-24 | Stop reason: HOSPADM

## 2018-09-23 RX ORDER — DOCUSATE SODIUM 100 MG/1
100 CAPSULE, LIQUID FILLED ORAL PRN
Status: DISCONTINUED | OUTPATIENT
Start: 2018-09-23 | End: 2018-09-24 | Stop reason: HOSPADM

## 2018-09-23 RX ADMIN — MEROPENEM 1 G: 1 INJECTION, POWDER, FOR SOLUTION INTRAVENOUS at 03:30

## 2018-09-23 RX ADMIN — FLUTICASONE PROPIONATE 2 SPRAY: 50 SPRAY, METERED NASAL at 13:34

## 2018-09-23 RX ADMIN — SODIUM CHLORIDE 500 ML: 9 INJECTION, SOLUTION INTRAVENOUS at 03:30

## 2018-09-23 RX ADMIN — GUAIFENESIN 600 MG: 600 TABLET, EXTENDED RELEASE ORAL at 10:08

## 2018-09-23 RX ADMIN — Medication 125 MG: at 22:44

## 2018-09-23 RX ADMIN — ACETAMINOPHEN 650 MG: 325 TABLET, FILM COATED ORAL at 02:20

## 2018-09-23 RX ADMIN — OLOPATADINE HYDROCHLORIDE 1 DROP: 1 SOLUTION/ DROPS OPHTHALMIC at 21:26

## 2018-09-23 RX ADMIN — Medication 125 MG: at 15:02

## 2018-09-23 RX ADMIN — VANCOMYCIN HYDROCHLORIDE 1000 MG: 1 INJECTION, POWDER, LYOPHILIZED, FOR SOLUTION INTRAVENOUS at 02:20

## 2018-09-23 RX ADMIN — FUROSEMIDE 20 MG: 20 TABLET ORAL at 10:09

## 2018-09-23 RX ADMIN — NYSTATIN 500000 UNITS: 100000 SUSPENSION ORAL at 13:34

## 2018-09-23 RX ADMIN — GUAIFENESIN 600 MG: 600 TABLET, EXTENDED RELEASE ORAL at 21:13

## 2018-09-23 RX ADMIN — ACETAMINOPHEN 1000 MG: 500 TABLET ORAL at 10:07

## 2018-09-23 RX ADMIN — Medication 10 ML: at 21:13

## 2018-09-23 RX ADMIN — IPRATROPIUM BROMIDE AND ALBUTEROL SULFATE 1 AMPULE: .5; 3 SOLUTION RESPIRATORY (INHALATION) at 02:15

## 2018-09-23 RX ADMIN — SODIUM CHLORIDE 1000 ML: 9 INJECTION, SOLUTION INTRAVENOUS at 10:07

## 2018-09-23 RX ADMIN — OLOPATADINE HYDROCHLORIDE 1 DROP: 1 SOLUTION/ DROPS OPHTHALMIC at 10:23

## 2018-09-23 RX ADMIN — NYSTATIN 500000 UNITS: 100000 SUSPENSION ORAL at 21:13

## 2018-09-23 RX ADMIN — SODIUM CHLORIDE: 900 INJECTION INTRAVENOUS at 13:03

## 2018-09-23 RX ADMIN — CALCIUM CARBONATE-VITAMIN D TAB 500 MG-200 UNIT 1 TABLET: 500-200 TAB at 10:08

## 2018-09-23 RX ADMIN — CALCIUM POLYCARBOPHIL 625 MG: 625 TABLET, FILM COATED ORAL at 10:09

## 2018-09-23 RX ADMIN — FAMOTIDINE 20 MG: 20 TABLET, FILM COATED ORAL at 10:08

## 2018-09-23 RX ADMIN — TAMSULOSIN HYDROCHLORIDE 0.4 MG: 0.4 CAPSULE ORAL at 10:08

## 2018-09-23 RX ADMIN — Medication 1250 MG: at 13:34

## 2018-09-23 RX ADMIN — SODIUM CHLORIDE 1000 ML: 9 INJECTION, SOLUTION INTRAVENOUS at 11:25

## 2018-09-23 RX ADMIN — PREDNISONE 15 MG: 5 TABLET ORAL at 10:08

## 2018-09-23 RX ADMIN — MEROPENEM 1 G: 1 INJECTION, POWDER, FOR SOLUTION INTRAVENOUS at 15:02

## 2018-09-23 ASSESSMENT — PAIN DESCRIPTION - LOCATION
LOCATION: BACK

## 2018-09-23 ASSESSMENT — PAIN SCALES - GENERAL
PAINLEVEL_OUTOF10: 10
PAINLEVEL_OUTOF10: 4
PAINLEVEL_OUTOF10: 0
PAINLEVEL_OUTOF10: 4
PAINLEVEL_OUTOF10: 4
PAINLEVEL_OUTOF10: 5
PAINLEVEL_OUTOF10: 10

## 2018-09-23 ASSESSMENT — PAIN DESCRIPTION - PROGRESSION
CLINICAL_PROGRESSION: NOT CHANGED

## 2018-09-23 ASSESSMENT — ENCOUNTER SYMPTOMS
NAUSEA: 0
SHORTNESS OF BREATH: 1
VOMITING: 0
ABDOMINAL PAIN: 0
COLOR CHANGE: 0
COUGH: 1
CHEST TIGHTNESS: 0

## 2018-09-23 ASSESSMENT — PAIN DESCRIPTION - ONSET
ONSET: ON-GOING
ONSET: ON-GOING

## 2018-09-23 ASSESSMENT — PAIN DESCRIPTION - DIRECTION
RADIATING_TOWARDS: FLANK
RADIATING_TOWARDS: FLANK

## 2018-09-23 ASSESSMENT — PAIN DESCRIPTION - PAIN TYPE
TYPE: CHRONIC PAIN

## 2018-09-23 ASSESSMENT — PAIN DESCRIPTION - FREQUENCY
FREQUENCY: CONTINUOUS
FREQUENCY: CONTINUOUS

## 2018-09-23 ASSESSMENT — PAIN DESCRIPTION - DESCRIPTORS
DESCRIPTORS: CONSTANT;ACHING;PRESSURE
DESCRIPTORS: CONSTANT;ACHING;PRESSURE

## 2018-09-23 ASSESSMENT — PAIN DESCRIPTION - ORIENTATION
ORIENTATION: MID
ORIENTATION: MID

## 2018-09-23 NOTE — CONSULTS
09/23/18   0056   WBC  10.1       Recent Labs      09/20/18   1547  09/23/18   0056   BUN  38*  34*   CREATININE  1.7*  1.9*         Intake/Output Summary (Last 24 hours) at 09/23/18 1033  Last data filed at 09/23/18 0530   Gross per 24 hour   Intake              600 ml   Output                0 ml   Net              600 ml       Culture Results:      Ht Readings from Last 1 Encounters:   09/23/18 5' 6\" (1.676 m)      Wt Readings from Last 1 Encounters:   09/23/18 188 lb 4.4 oz (85.4 kg)         Estimated Creatinine Clearance: 30 mL/min (A) (based on SCr of 1.9 mg/dL (H)). Assessment/Plan:  Will initiate vancomycin 1250 mg IV x 1                  Regimen projects a trough level of 15-20 mg/L. Timing of trough level will be determined based on culture results, renal function, and clinical response. Thank you for the consult. Will continue to follow.

## 2018-09-23 NOTE — CONSULTS
palpitations/ dizziness  Denies nausea/ vomiting/abdominal pain/diarrhea. Denies dysuria or change in urinary function. Denies joint swelling or pain. No myalgia, arthralgia. No rashes, skin lesions   Denies focal weakness, sensory change or other neurologic symptoms  No lymph node swelling or tenderness.     Fevers, sob, sputum+     PHYSICAL EXAM:      Vitals:  T max  103  BP (!) 88/37   Pulse 80   Temp 99.5 °F (37.5 °C) (Axillary)   Resp 15   Ht 5' 6\" (1.676 m)   Wt 186 lb 4.6 oz (84.5 kg)   SpO2 100%   BMI 30.07 kg/m²     General Appearance: alert,in  acute distress, ++pallor, no icterus on nasal cannula+ in resp distress  Skin: warm and dry, no rash or erythema  Head: normocephalic and atraumatic  Eyes: pupils equal, round, and reactive to light, conjunctivae normal  ENT: tympanic membrane, external ear and ear canal normal bilaterally, nose without deformity, nasal mucosa and turbinates normal without polyps  Neck: supple and non-tender without mass, no thyromegaly  no cervical lymphadenopathy  Pulmonary/Chest: coarse crepts+ bi basal + no wheezes, rales or rhonchi, normal air movement, in  respiratory distress  Cardiovascular: normal rate, regular rhythm, normal S1 and S2, ESM+  murmurs, rubs, clicks, or gallops, no carotid bruits PPM+   Abdomen: soft, non-tender, non-distended, normal bowel sounds, no masses or organomegaly  Extremities: no cyanosis, clubbing or edema  Musculoskeletal: normal range of motion, no joint swelling, deformity or tenderness  Neurologic: reflexes normal and symmetric, no cranial nerve deficit  Psych:  Orientation, sensorium, mood normal  Lines:  Chest port          DATA:    Lab Results   Component Value Date    WBC 10.1 09/23/2018    HGB 8.4 (L) 09/23/2018    HCT 26.1 (L) 09/23/2018    MCV 88.7 09/23/2018    PLT 22 (L) 09/23/2018     Lab Results   Component Value Date    CREATININE 1.9 (H) 09/23/2018    BUN 34 (H) 09/23/2018     09/23/2018    K 4.2 09/23/2018    CL

## 2018-09-23 NOTE — PLAN OF CARE
Problem: Pain:  Goal: Pain level will decrease  Pain level will decrease   Outcome: Ongoing  Pain/discomfort being managed with PRN analgesics per MD orders. Pt able to express presence and absence of pain and rate pain appropriately using numerical scale. Electronically signed by Alvina Alicea RN on 9/23/2018 at 7:56 AM          Problem: Risk for Impaired Skin Integrity  Goal: Tissue integrity - skin and mucous membranes  Structural intactness and normal physiological function of skin and  mucous membranes. Outcome: Ongoing  Skin assessment completed every shift. Pt assessed for incontinence, appropriate barrier cream applied prn. Pt encouraged to turn/rotate every 2 hours. Assistance provided if pt unable to do so themselves. Electronically signed by Alvina Alicea RN on 9/23/2018 at 7:58 AM        Problem: Bowel/Gastric:  Goal: Occurrences of diarrhea will decrease  Occurrences of diarrhea will decrease  Outcome: Ongoing  Monitor intake and output, bowel movements and consistency. Administer medications as ordered. Good hand hygiene, instruct visitors on isolation precautions. Monitor skin integrity.   Electronically signed by Alvina Alicea RN on 9/23/2018 at 8:01 AM

## 2018-09-23 NOTE — ED NOTES
Patient given glass of water and adjusted in bed. Anxious to get out of ER and into more comfortable bed.       Alek Rojas RN  09/23/18 0200

## 2018-09-23 NOTE — CONSULTS
REASON FOR CONSULTATION/CC: COPD with pneumonia      Consult at request of  Manan Valdez MD     PCP: Griselda Buckle, MD  Established Pulmonologist:  None    HISTORY OF PRESENT ILLNESS: Chalo Gallego is a 80y.o. year old male with a history of who presents with     He was recently admitted at this month for shortness breath and weakness and increased oxygen requirement. He was treated for pneumonia with cefepime with consultations of C. diff and treated with oral vancomycin and pneumonia was treated with doxycycline.           PAST MEDICAL HISTORY:  Past Medical History:   Diagnosis Date    Anemia     \"BORDERLINE CANCER\" AS PER DR GROVES    Anxiety     Arrhythmia     Arthritis     Blood transfusion 1996 AND 2006    CAD (coronary artery disease) 8/26/2013    Cancer (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 [address]     Former cigarette smoker     Hyperlipemia     Hypertension     MDS (myelodysplastic syndrome) ([facility] [address]     MDS (myelodysplastic syndrome) ([facility] [address] 07/2018    Dr Rojas Bolds Multiple myeloma [facility]) 2009    Renal insufficiency     RT MEDS       PAST SURGICAL HISTORY:  Past Surgical History:   Procedure Laterality Date    ABDOMINAL AORTIC ANEURYSM REPAIR      AORTIC VALVE REPLACEMENT  10/2011    [place]    FUSION    CARDIAC PACEMAKER PLACEMENT  04/2017    CARDIAC SURGERY      aortic valve replacement    CARPAL TUNNEL RELEASE      BILAT    COLONOSCOPY  11/19/12    ERCP  4/5/2016    Sphincterotomy, Balloon Sweep    JOINT REPLACEMENT      BILAT KNEE    SINUS SURGERY      SKIN CANCER EXCISION      TENDON MANIPULATION      RT ELBOW    UPPER GASTROINTESTINAL ENDOSCOPY  3/9/16    with EUS and esophageal and stomach biopsie    UPPER GASTROINTESTINAL ENDOSCOPY 04/12/2016    with Ablation    UVULOPALATOPHARYGOPLASTY         FAMILY HISTORY:  family history includes Cancer in his brother and father; Diabetes in his father and mother; Heart Disease in his maternal grandmother and mother; High Blood Pressure in his mother; Stroke in his sister. SOCIAL HISTORY:   reports that he quit smoking about 40 years ago. He has a 90.00 pack-year smoking history. He has never used smokeless tobacco.    Scheduled Meds:   polycarbophil  625 mg Oral Daily    fluticasone  2 spray Nasal Daily    olopatadine  1 drop Both Eyes BID    predniSONE  15 mg Oral Daily    furosemide  20 mg Oral BID    vancomycin  125 mg Oral 3 times per day    tamsulosin  0.4 mg Oral Daily    nystatin  500,000 Units Oral 4x Daily    Oyster Shell Calcium/D  1 tablet Oral Daily with breakfast    sodium chloride flush  10 mL Intravenous 2 times per day    meropenem  1 g Intravenous Q12H    famotidine  20 mg Oral Daily    guaiFENesin  600 mg Oral BID    sodium chloride  30 mL/kg Intravenous Once    vancomycin (VANCOCIN) intermittent dosing (placeholder)   Other RX Placeholder    vancomycin  1,250 mg Intravenous Once       Continuous Infusions:      PRN Meds:  docusate sodium, polyethylene glycol, oxyCODONE, albuterol sulfate HFA, albuterol, acetaminophen, phenylephrine-mineral oil-petrolatum, ALPRAZolam, sodium chloride flush, ondansetron    ALLERGIES:  Patient is allergic to metoprolol succinate [metoprolol]; penicillins; procardia [nifedipine]; ultram [tramadol hcl]; aspirin; nsaids; tetanus toxoids; and ninlaro [ixazomib].     REVIEW OF SYSTEMS:  Constitutional: Negative for fever   HENT: Negative for sore throat  Eyes: Negative for redness   Respiratory: + for dyspnea, cough  Cardiovascular: Negative for chest pain  Gastrointestinal: Negative for vomiting, diarrhea   Genitourinary: Negative for hematuria   Musculoskeletal: Negative for arthralgias   Skin: Negative for rash  Neurological: Negative

## 2018-09-23 NOTE — CARE COORDINATION
Meeting scheduled with [address] on Monday 9/24/18 @10:00 am.   Electronically signed by Ana Juarez on 9/23/2018 at 5:49 PM

## 2018-09-23 NOTE — CONSULTS
HEMATOLOGY/ONCOLOGY CONSULTATION:     2018 12:28 PM    REASON FOR CONSULT: anemia, myeloma, thrombocytopenia    PROVIDERS:  Lisa Toth MD    CHIEF COMPLAINT:     Chief Complaint   Patient presents with    Shortness of Breath     HISTORY OF PRESENT ILLNESS:     Mr. Lupe Gan is [de-identified] yr old male with numerous medical problems, followed by Dr. Mekhi Wilson for myeloma, but also complicated by MDS, CKD, CHF. He was just recently hospitalized for pneumonia, but family brought him in midnight because of worsening dyspnea. He was admitted to floor but because of hypotension with ongoing hypoxia, he was transferred to ICU. This am, there are multiple family members at bedside. His caretaker/daughter/POA tells me that they want Hospice care, and pt himself says same. Numerous family members are in agreement. One family member's father just , and had Hospice care in [place]. LAST OFFICE VISIT:  Patient Name: Brayan Rodriguez   Patient : 1935   Patient MRN: [id number]   Primary Oncologist: Nancy Rivas   Referring Physician: Marly Recio MD, [facility]   Date of Service: 2018   Chief Complaint  Multiple myeloma (disorder) ( Stage Date: 2011, Stage ISS Stage III  Date of Dx:2011 Disease Status: Relapse #2; )   Myelodysplastic syndrome (disorder) ( Stage Date: Unknown, Stage MDS with multilineage dysplasia (MDS-MLD)  Date of Dx:2018 IPSS-Marrow Blasts (%): < 5 (0 points); IPSS-Karyotype: Abnormal chromosome 7 or 3 or more abnormalities (1 point); IPSS-Cytopenia: Cytopenia of 2 or 3 cell types (0.5 points); IPSS Score: Intermediate risk-2 (1.5 - 2 points); BFKK-H-Tkksfackj:  Intermediate: del(7q), +8, +19, i(17q), any other single or double independent clones (2 points); IPSS-R-Marrow Blasts (%): <= 2 (0 points); IPSS-R-Hgb (g/dL): < 8 (1.5 points); IPSS-R-Platelets (R98^9/P): < 50 (1 point); IPSS-R-ANC (x10^9/L): >= 0.8 (0 points); IPSS-R Score: High risk (> 4.5 to 6 points); SCT (finding)   Pure hypercholesterolemia (disorder)   Senile hyperkeratosis (disorder)   Shock (disorder)   Thrombocytopenic disorder (disorder)   Upper gastrointestinal hemorrhage (disorder)   Upper respiratory infection (disorder)    HPI    1. IgA multiple myeloma diagnosed in . 2. Chronic renal insufficiency. 3. Peripheral vascular disease status post stent. 4. Chronic anemia. 5. Aortic valve replacement. 6. Ventral hernia. 7. Allergic reaction to Revlimid, Pomalyst, and Ninlaro with rashes. 8. Abdominal aortic aneurysm. 9. Poor tolerance of Zometa. 10. Pancreatitis thought to be from diabetic medication. 11. Bleeding after sphincterectomy for gallstone in . Previous Therapies  Kyprolis, total of nine cycles finished in 2014. Previously treated with Velcade and dexamethasone. Current Therapy   Epoetin Yang D1 Q14D Cycle Length: 14 Number Cycles: 30 Start: Anna Kennedy on 2017 Assoc Dx: Anemia of chronic renal failure (disorder) LOT: Other 2017 C3 D1  Epoetin Yang Subcutaneous, 26413 unit  Elotuzumab D1,8,15,22 fb D1,15 + Lenalidomide D1-21 + Dexamethasone D1,8,15,22 Q28D Cycle Length: 28 Number Cycles: 6 Start: C1D1 on 2018 Assoc Dx: Multiple myeloma (disorder) LOT: 5th Line Stage: ISS Stage III 2018 C2 D1  Elotuzumab IV, 925 mg (10 mg/kg)   Elotuzumab IV, 925 mg (10 mg/kg) q2w   Dexamethasone Oral, 28 mg   Dexamethasone Oral, 28 mg  Azacitidine D1-5 Q28D Cycle Length: 28 Number Cycles: 6 Start: Anna Kennedy on 2018 Assoc Dx: Myelodysplastic syndrome (disorder) LOT: 1st Line Stage: MDS with multilineage dysplasia (MDS-MLD) 2018 C6 D3  Vidaza (Azacitidine IV), 155 mg (75 mg/m2)  IV Hydration Cycle Length: 1 Number Cycles: 1 Start: Anna Kennedy on 2018 Assoc Dx: Multiple myeloma (disorder) LOT: Other 2018 C1 D1  Sodium Chloride IV 0.9 %,  mL, Dose modified  Interval History     Prabhu Burr returns for follow up and treatment. He continues to feel well.  He was able Smoking status: Former Smoker     Packs/day: 3.00     Years: 30.00     Quit date: 4/26/1978    Smokeless tobacco: Never Used    Alcohol use No    Drug use: No    Sexual activity: No     Other Topics Concern    Not on file     Social History Narrative    No narrative on file       FAMILY HISTORY:     Family History   Problem Relation Age of Onset    Heart Disease Mother     High Blood Pressure Mother     Diabetes Mother     Diabetes Father     Cancer Father     Stroke Sister     Heart Disease Maternal Grandmother     Cancer Brother        ALLERGIES:     Allergies as of 09/23/2018 - Review Complete 09/23/2018   Allergen Reaction Noted    Metoprolol succinate [metoprolol] Shortness Of Breath and Palpitations 12/11/2011    Penicillins Hives, Shortness Of Breath, and Swelling 04/21/2009    Procardia [nifedipine] Anaphylaxis 02/01/2011    Ultram [tramadol hcl] Other (See Comments) 05/02/2011    Aspirin  02/01/2011    Nsaids Other (See Comments) 04/21/2009    Tetanus toxoids Other (See Comments) 04/21/2009    Ninlaro [ixazomib] Rash 08/29/2017       MEDICATIONS:     Current Facility-Administered Medications   Medication Dose Route Frequency Provider Last Rate Last Dose    polycarbophil (FIBERCON) tablet 625 mg  625 mg Oral Daily Ann Stahl DO   625 mg at 09/23/18 1009    docusate sodium (COLACE) capsule 100 mg  100 mg Oral PRN Ann Stahl DO        fluticasone (FLONASE) 50 MCG/ACT nasal spray 2 spray  2 spray Nasal Daily Ann Stahl DO        polyethylene glycol (GLYCOLAX) packet 17 g  17 g Oral Daily PRN Ann Stahl DO        oxyCODONE (ROXICODONE) immediate release tablet 5 mg  5 mg Oral Q4H PRN Ann Stahl DO        olopatadine (PATANOL) 0.1 % ophthalmic solution 1 drop  1 drop Both Eyes BID Ann Stahl DO   1 drop at 09/23/18 1023    predniSONE (DELTASONE) tablet 15 mg  15 mg Oral Daily Ann Stahl DO   15 mg at 09/23/18 1008    albuterol sulfate  (90 Base) MCG/ACT inhaler 2  sodium chloride flush 0.9 % injection 10 mL  10 mL Intravenous PRN Arcelia Choi MD           REVIEW OF SYSTEMS:      · Constitutional: Denies  weight loss  · Eyes: No visual changes or diplopia. No scleral icterus. · ENT: No Headaches, hearing loss or vertigo. No mouth sores or sore throat. · Cardiovascular: See above. · Respiratory: See above. · Gastrointestinal: Poor appetite blood in stools. No change in bowel habits. · Genitourinary: No dysuria, trouble voiding, or hematuria. · Musculoskeletal: Generalized weakness. No joint complaints. · Integumentary: No rash or pruritis. · Neurological: No headache, diplopia. No change in gait, balance, or coordination. No paresthesias. · Endocrine: No temperature intolerance. No excessive thirst, fluid intake, or urination. · Hematologic/Lymphatic: +bruising,, blood clots or swollen lymph nodes. · Allergic/Immunologic: No nasal congestion or hives. ·     PHYSICAL EXAM:       BP (!) 99/38   Pulse 91   Temp 99.5 °F (37.5 °C) (Axillary)   Resp 28   Ht 5' 6\" (1.676 m)   Wt 186 lb 4.6 oz (84.5 kg)   SpO2 99%   BMI 30.07 kg/m²     Exam limited-mostly end of life conversation  General appearance: Alert and cooperative. Appears stated age. Very dyspneic, multiple family members at bedside  HEENT: Normocephalic, without obvious abnormality, atraumatic. No scleral icterus. Normal mucus membranes. Normal mouth exam  Neck: No JVD. Heart: Regular rate and rhythm, S1, S2 normal  Extremities: without cyanosis, clubbing, edema or asymmetry  Skin: Ecchymoses noted. No jaundice, purpura or petechiae  Neuro:  Alert and oriented. Speech is soft. Mentation is normal.  Patient is able to carry a complex conversation regarding their illness.         LABS:     Lab Results   Component Value Date    WBC 10.1 09/23/2018    HGB 8.4 (L) 09/23/2018    HCT 26.1 (L) 09/23/2018    MCV 88.7 09/23/2018    PLT 22 (L) 09/23/2018       Lab Results   Component Value Date    GLUCOSE

## 2018-09-23 NOTE — CONSULTS
Recent Labs      09/20/18   1547  09/23/18   0056   BUN  38*  34*   CREATININE  1.7*  1.9*       No intake or output data in the 24 hours ending 09/23/18 0206    Culture Results:  pending    Ht Readings from Last 1 Encounters:   09/23/18 5' 6\" (1.676 m)        Wt Readings from Last 1 Encounters:   09/23/18 183 lb (83 kg)         Estimated Creatinine Clearance: 30 mL/min (A) (based on SCr of 1.9 mg/dL (H)). Assessment/Plan:  Will initiate vancomycin 1000 mg IV once. Thank you for the consult. Will await further orders from the attending.    Gabriela Delgado [facility],9/23/2018,2:07 AM

## 2018-09-23 NOTE — H&P
non-distended with normal bowel sounds. Musculoskeletal:  No clubbing, cyanosis or edema bilaterally. Full range of motion without deformity. Skin: Skin color, texture, turgor normal.  No rashes or lesions. Neurologic:  Neurovascularly intact without any focal sensory/motor deficits. Cranial nerves: II-XII intact, grossly non-focal.  Capillary Refill: Brisk,< 3 seconds   Peripheral Pulses: +2 palpable, equal bilaterally       CXR:  I have reviewed the CXR with the following interpretation:   EKG:  I have reviewed the EKG with the following interpretation:    Labs:     Recent Labs      09/23/18   0056   WBC  10.1   HGB  8.4*   HCT  26.1*   PLT  22*     Recent Labs      09/20/18   1547  09/23/18   0056   NA  135*  136   K  4.6  4.2   CL  86*  88*   CO2  30  33*   BUN  38*  34*   CREATININE  1.7*  1.9*   CALCIUM  9.0  9.4     Recent Labs      09/23/18   0056   AST  22   ALT  14   BILITOT  1.0   ALKPHOS  64     No results for input(s): INR in the last 72 hours. Recent Labs      09/23/18   0056   TROPONINI  0.02*       Urinalysis:      Lab Results   Component Value Date    NITRU Negative 09/21/2018    WBCUA 0 09/21/2018    BACTERIA 1+ 12/11/2011    RBCUA 1 09/21/2018    BLOODU Negative 09/21/2018    SPECGRAV 1.014 09/21/2018    GLUCOSEU Negative 09/21/2018    GLUCOSEU Negative 07/11/2012         ASSESSMENT:      Acute on chronic hypoxic respiratory failure dt pneumonia,ILD on recent CT 9/14  Severe sepsis. . Criteria fever,leucocytosis,sinus tach. potential source PNA/line infection  Hypotension dt severe sepsis  Recent MSSA pneumonia-+ve sputum cx-on oral doxy  Recent c diff- on oral Vanc  multiple myeloma-last chemo 3 weeks ago  MDS with chronic anemia and thrombocytopenia  CKD stage 3  Chronic troponin elevation-mild in setting of CKD  CAD s/p CABG,bioposthetic AVR  Chronic diastolic HF-compensated  Chronic low back pain      PLAN:    IV fluid bolus 30cc/kg given-- BP improved  Serial lactate  Empiric abx--merrem,

## 2018-09-23 NOTE — ED NOTES
Report from Meaghan Cannon to assume care. Patient pending admission.       Adilson Velarde RN  09/23/18 1989

## 2018-09-23 NOTE — ED TRIAGE NOTES
Pt comes to ER due to hypoxia noted at home. EMS arrives with pox noted in the 70s. Family member placed a non-rebreather on 5L. EMS states pt was recently discharged from [facility] with PNE. Pt in room at this time. Resp are labored at times. Pt placed on NC 4L maintaining pox 94-98%. Skin WNL.

## 2018-09-24 VITALS
OXYGEN SATURATION: 99 % | BODY MASS INDEX: 29.58 KG/M2 | RESPIRATION RATE: 20 BRPM | DIASTOLIC BLOOD PRESSURE: 48 MMHG | HEART RATE: 96 BPM | SYSTOLIC BLOOD PRESSURE: 111 MMHG | HEIGHT: 66 IN | WEIGHT: 184.08 LBS | TEMPERATURE: 98 F

## 2018-09-24 LAB
ABO/RH: NORMAL
ANION GAP SERPL CALCULATED.3IONS-SCNC: 14 MMOL/L (ref 3–16)
ANTIBODY SCREEN: NORMAL
BLOOD BANK DISPENSE STATUS: NORMAL
BLOOD BANK PRODUCT CODE: NORMAL
BPU ID: NORMAL
BUN BLDV-MCNC: 41 MG/DL (ref 7–20)
CALCIUM SERPL-MCNC: 7.6 MG/DL (ref 8.3–10.6)
CHLORIDE BLD-SCNC: 92 MMOL/L (ref 99–110)
CO2: 28 MMOL/L (ref 21–32)
CREAT SERPL-MCNC: 2.2 MG/DL (ref 0.8–1.3)
DESCRIPTION BLOOD BANK: NORMAL
GFR AFRICAN AMERICAN: 35
GFR NON-AFRICAN AMERICAN: 29
GLUCOSE BLD-MCNC: 82 MG/DL (ref 70–99)
HCT VFR BLD CALC: 21 % (ref 40.5–52.5)
HEMATOLOGY PATH CONSULT: NORMAL
HEMOGLOBIN: 6.8 G/DL (ref 13.5–17.5)
MAGNESIUM: 1.8 MG/DL (ref 1.8–2.4)
PHOSPHORUS: 4.6 MG/DL (ref 2.5–4.9)
POTASSIUM REFLEX MAGNESIUM: 3.9 MMOL/L (ref 3.5–5.1)
PROCALCITONIN: 5.34 NG/ML (ref 0–0.15)
SODIUM BLD-SCNC: 134 MMOL/L (ref 136–145)
VANCOMYCIN RANDOM: 17.7 UG/ML

## 2018-09-24 PROCEDURE — 36430 TRANSFUSION BLD/BLD COMPNT: CPT | Performed by: NURSE PRACTITIONER

## 2018-09-24 PROCEDURE — 6370000000 HC RX 637 (ALT 250 FOR IP): Performed by: NURSE PRACTITIONER

## 2018-09-24 PROCEDURE — 94762 N-INVAS EAR/PLS OXIMTRY CONT: CPT

## 2018-09-24 PROCEDURE — 84145 PROCALCITONIN (PCT): CPT

## 2018-09-24 PROCEDURE — 6370000000 HC RX 637 (ALT 250 FOR IP): Performed by: FAMILY MEDICINE

## 2018-09-24 PROCEDURE — 83735 ASSAY OF MAGNESIUM: CPT

## 2018-09-24 PROCEDURE — 85014 HEMATOCRIT: CPT

## 2018-09-24 PROCEDURE — 86850 RBC ANTIBODY SCREEN: CPT

## 2018-09-24 PROCEDURE — 85018 HEMOGLOBIN: CPT

## 2018-09-24 PROCEDURE — 6360000002 HC RX W HCPCS: Performed by: FAMILY MEDICINE

## 2018-09-24 PROCEDURE — 2700000000 HC OXYGEN THERAPY PER DAY

## 2018-09-24 PROCEDURE — 9990000010 HC NO CHARGE VISIT

## 2018-09-24 PROCEDURE — 2580000003 HC RX 258: Performed by: INTERNAL MEDICINE

## 2018-09-24 PROCEDURE — 86900 BLOOD TYPING SEROLOGIC ABO: CPT

## 2018-09-24 PROCEDURE — 80202 ASSAY OF VANCOMYCIN: CPT

## 2018-09-24 PROCEDURE — 99231 SBSQ HOSP IP/OBS SF/LOW 25: CPT | Performed by: INTERNAL MEDICINE

## 2018-09-24 PROCEDURE — 84100 ASSAY OF PHOSPHORUS: CPT

## 2018-09-24 PROCEDURE — 80048 BASIC METABOLIC PNL TOTAL CA: CPT

## 2018-09-24 PROCEDURE — 86923 COMPATIBILITY TEST ELECTRIC: CPT

## 2018-09-24 PROCEDURE — 2580000003 HC RX 258: Performed by: FAMILY MEDICINE

## 2018-09-24 PROCEDURE — 36591 DRAW BLOOD OFF VENOUS DEVICE: CPT

## 2018-09-24 PROCEDURE — P9016 RBC LEUKOCYTES REDUCED: HCPCS

## 2018-09-24 PROCEDURE — 86901 BLOOD TYPING SEROLOGIC RH(D): CPT

## 2018-09-24 PROCEDURE — 85027 COMPLETE CBC AUTOMATED: CPT

## 2018-09-24 PROCEDURE — 36415 COLL VENOUS BLD VENIPUNCTURE: CPT

## 2018-09-24 RX ORDER — ALPRAZOLAM 1 MG/1
1 TABLET ORAL 3 TIMES DAILY PRN
Qty: 15 TABLET | Refills: 0 | Status: SHIPPED | OUTPATIENT
Start: 2018-09-24 | End: 2018-09-29

## 2018-09-24 RX ORDER — 0.9 % SODIUM CHLORIDE 0.9 %
250 INTRAVENOUS SOLUTION INTRAVENOUS ONCE
Status: COMPLETED | OUTPATIENT
Start: 2018-09-24 | End: 2018-09-24

## 2018-09-24 RX ORDER — MORPHINE SULFATE 10 MG/5ML
5 SOLUTION ORAL EVERY 4 HOURS PRN
Qty: 1 BOTTLE | Refills: 0 | Status: SHIPPED | OUTPATIENT
Start: 2018-09-24 | End: 2018-09-29

## 2018-09-24 RX ORDER — ALPRAZOLAM 0.5 MG/1
0.5 TABLET ORAL EVERY 4 HOURS PRN
Status: DISCONTINUED | OUTPATIENT
Start: 2018-09-24 | End: 2018-09-24 | Stop reason: HOSPADM

## 2018-09-24 RX ADMIN — TAMSULOSIN HYDROCHLORIDE 0.4 MG: 0.4 CAPSULE ORAL at 09:02

## 2018-09-24 RX ADMIN — OLOPATADINE HYDROCHLORIDE 1 DROP: 1 SOLUTION/ DROPS OPHTHALMIC at 09:11

## 2018-09-24 RX ADMIN — SODIUM CHLORIDE: 900 INJECTION INTRAVENOUS at 09:03

## 2018-09-24 RX ADMIN — ALPRAZOLAM 1 MG: 0.5 TABLET ORAL at 00:07

## 2018-09-24 RX ADMIN — PREDNISONE 15 MG: 5 TABLET ORAL at 09:02

## 2018-09-24 RX ADMIN — ALPRAZOLAM 0.5 MG: 0.5 TABLET ORAL at 04:10

## 2018-09-24 RX ADMIN — Medication 125 MG: at 13:02

## 2018-09-24 RX ADMIN — MEROPENEM 1 G: 1 INJECTION, POWDER, FOR SOLUTION INTRAVENOUS at 02:22

## 2018-09-24 RX ADMIN — NYSTATIN 500000 UNITS: 100000 SUSPENSION ORAL at 09:02

## 2018-09-24 RX ADMIN — Medication 10 ML: at 09:02

## 2018-09-24 RX ADMIN — Medication 125 MG: at 05:40

## 2018-09-24 RX ADMIN — SODIUM CHLORIDE: 900 INJECTION INTRAVENOUS at 02:22

## 2018-09-24 RX ADMIN — ACETAMINOPHEN 1000 MG: 500 TABLET ORAL at 12:14

## 2018-09-24 RX ADMIN — FLUTICASONE PROPIONATE 2 SPRAY: 50 SPRAY, METERED NASAL at 09:10

## 2018-09-24 RX ADMIN — SODIUM CHLORIDE 250 ML: 9 INJECTION, SOLUTION INTRAVENOUS at 09:02

## 2018-09-24 RX ADMIN — SODIUM CHLORIDE: 900 INJECTION INTRAVENOUS at 05:39

## 2018-09-24 RX ADMIN — FAMOTIDINE 20 MG: 20 TABLET, FILM COATED ORAL at 09:02

## 2018-09-24 RX ADMIN — GUAIFENESIN 600 MG: 600 TABLET, EXTENDED RELEASE ORAL at 09:02

## 2018-09-24 RX ADMIN — CALCIUM CARBONATE-VITAMIN D TAB 500 MG-200 UNIT 1 TABLET: 500-200 TAB at 09:02

## 2018-09-24 ASSESSMENT — PAIN SCALES - GENERAL
PAINLEVEL_OUTOF10: 2
PAINLEVEL_OUTOF10: 4
PAINLEVEL_OUTOF10: 0
PAINLEVEL_OUTOF10: 4
PAINLEVEL_OUTOF10: 5

## 2018-09-24 ASSESSMENT — PAIN DESCRIPTION - DESCRIPTORS
DESCRIPTORS: ACHING;CONSTANT;PRESSURE
DESCRIPTORS: ACHING;CONSTANT;DISCOMFORT;PRESSURE

## 2018-09-24 ASSESSMENT — PAIN DESCRIPTION - PROGRESSION
CLINICAL_PROGRESSION: NOT CHANGED

## 2018-09-24 ASSESSMENT — PAIN DESCRIPTION - ONSET: ONSET: ON-GOING

## 2018-09-24 ASSESSMENT — PAIN DESCRIPTION - ORIENTATION
ORIENTATION: MID
ORIENTATION: MID

## 2018-09-24 ASSESSMENT — PAIN DESCRIPTION - DIRECTION
RADIATING_TOWARDS: FLANK
RADIATING_TOWARDS: FLANK

## 2018-09-24 ASSESSMENT — PAIN DESCRIPTION - PAIN TYPE
TYPE: CHRONIC PAIN

## 2018-09-24 ASSESSMENT — PAIN DESCRIPTION - LOCATION
LOCATION: BACK

## 2018-09-24 ASSESSMENT — PAIN DESCRIPTION - FREQUENCY
FREQUENCY: CONTINUOUS
FREQUENCY: CONTINUOUS

## 2018-09-24 ASSESSMENT — PAIN SCALES - WONG BAKER: WONGBAKER_NUMERICALRESPONSE: 0

## 2018-09-24 NOTE — PROGRESS NOTES
NUTRITION THERAPY ASSESSMENT    Due to hospice status, patient will be followed at low nutrition risk. Dietitian will sign off. If nutrition intervention is required, please submit a dietary consult.

## 2018-09-24 NOTE — PLAN OF CARE
Problem: Falls - Risk of:  Goal: Will remain free from falls  Will remain free from falls   Outcome: Ongoing  Bed alarm in use. Sound com fall risk light on. Hourly rounding. Call light within reach. Electronically signed by Solomon Rosenthal RN on 9/24/2018 at 9:42 AM    Goal: Absence of physical injury  Absence of physical injury   Outcome: Completed Date Met: 09/24/18  No injury, no fall. Electronically signed by Solomon Rosenthal RN on 9/24/2018 at 9:43 AM      Problem: Pain:  Goal: Pain level will decrease  Pain level will decrease   Outcome: Ongoing  Assess pain Q4H with ICU assessment and treat per MD orders. Reassess pain within 30 minutes of IV pain medication intervention or within 60 minutes of PO pain medication intervention. Use IQBAL Face scale assessment when pt not awake or alert to assess pain. Electronically signed by Solomon Rosenthal RN on 9/24/2018 at 9:43 AM    Goal: Control of acute pain  Control of acute pain   Outcome: Ongoing  PRN oxycodone. Electronically signed by Solomon Rosenthal RN on 9/24/2018 at 9:43 AM    Goal: Control of chronic pain  Control of chronic pain   Outcome: Ongoing  No chronic pain meds noted. Electronically signed by Solomon Rosenthal RN on 9/24/2018 at 9:43 AM      Problem: Risk for Impaired Skin Integrity  Goal: Tissue integrity - skin and mucous membranes  Structural intactness and normal physiological function of skin and  mucous membranes. Outcome: Ongoing  Turn Q2H. Oni barrier wipes for alessandro care Qshift. Dry flow pad in use on bed. Electronically signed by Solomon Rosenthal RN on 9/24/2018 at 9:43 AM      Problem: Bowel/Gastric:  Goal: Occurrences of diarrhea will decrease  Occurrences of diarrhea will decrease   Outcome: Ongoing  PRN barrier cream to buttocks.     Electronically signed by Solomon Rosenthal RN on 9/24/2018 at 9:43 AM      Problem: Fluid Volume:  Goal: Will show no signs and symptoms of electrolyte imbalance  Will show no signs and symptoms of electrolyte imbalance   Outcome: Ongoing  Strict I&O. Electronically signed by Katerine Stahl RN on 9/24/2018 at 9:43 AM      Problem: Skin Integrity:  Goal: Risk for impaired skin integrity will decrease  Risk for impaired skin integrity will decrease   Outcome: Ongoing  Turn Q2H. Oni barrier wipes for alessandro care Qshift. Dry flow pad in use on bed. Electronically signed by Katerine Stahl RN on 9/24/2018 at 9:44 AM      Problem: Infection - Central Venous Catheter-Associated Bloodstream Infection:  Goal: Will show no infection signs and symptoms  Will show no infection signs and symptoms  Outcome: Ongoing  Pt has a port accessed as central line. All lumens flushed this AM with 10 mL NS each. Checked for blood return. + blood return. Swab caps in use on all open ports. Dsg intact and dated 9/23/18. Continued use of central line necessary for care at this time for blood transfusions and blood draws and IV medications. Daily bath with Chlorhexidine for infection prevention. Gloves worn after hand hygiene performed when touching any part of central line, dressing, or IV pump/tubings.      Electronically signed by Katerine Stahl RN on 9/24/2018 at 9:44 AM

## 2018-09-24 NOTE — PROGRESS NOTES
Occupational Therapy  OT eval and treat orders received and appreciated. Chart review initiated, noted HOC stepping in. Confirmed with RN pt now transferring to [address] [place] [address] hospital shortly, will dc acute OT order. Thank you for this referral.  Chetan Olson.  [address] [facility], OTR/L [id number]

## 2018-09-24 NOTE — PROGRESS NOTES
1900: Report received from Hendricks Dance, [facility]. Bedside handoff and skin assessment complete. Patient has Normal Saline running, see emar. Patient has loud, hacking cough. Patient's sputum was light brown. Patient's test for clostridium difficile antigen and toxin was negative. Patient taken out of isolation and order discontinued, per [facility] policy's and procedures. 2000: Assessment complete, see flowsheets. Patient vital signs stable. Patient recalling stories about being [place] the [place]. Patient had bowel movement due to \"force of coughing\". Patient has redness on coccyx and buttocks. Zinc oxide applied. All IV's flushed, brisk blood return noted. Patient successfully used urinal. Patient repositioned. 2200: Patient sitting in bed quietly, watching television. Patient repositioned again. Patient also reaffirmed his code status by stating that he \" wanted to only be kept comfortable. \"     0007: Xanax given prn, due to request of patient. 0235Yanna Roche NP perfect served about patient's inability to fall asleep longer than 2 hours. Orders placed for Q4 prn Xanax. 0430: Lab called with critical results, see results review. 56: Dr. Marylou Helton called about critical results. Orders to redraw H&H.    0518: New results for h&h. See flowsheets. 1060: Call from Dr. Marylou Helton about patient's labs. 9190: Dr Marylou Helton at patient's bedside, discussing patient's wishes regarding blood transfusion, which would improve SOB that he was experiencing. Patient agreed to transfusion. 8185: Patient type and cross match sent to lab. 9326: Informed consent signed for blood transfusion and placed in patients paper chart.      0700: Handoff given to patrick velazquez    Electronically signed by Ernesto Hernandez RN on 9/28/2018 at 6:51 PM

## 2018-09-24 NOTE — PROGRESS NOTES
4 Eyes Skin Assessment     The patient is being assess for  Shift Handoff    I agree that 2 RN's have performed a thorough Head to Toe Skin Assessment on the patient. ALL assessment sites listed below have been assessed. Areas assessed by both nurses: Trista/   [x]   Head, Face, and Ears   [x]   Shoulders, Back, and Chest  [x]   Arms, Elbows, and Hands   [x]   Coccyx, Sacrum, and IschIum  [x]   Legs, Feet, and Heels        Does the Patient have Skin Breakdown?   Yes, MASD at rectum      Cam Prevention initiated:  Yes   Wound Care Orders initiated:  No      C nurse consulted for Pressure Injury (Stage 3,4, Unstageable, DTI, NWPT, and Complex wounds), New and Established Ostomies:  No      Nurse 1 eSignature: Electronically signed by Wayne Blackburn RN on 9/24/18 at 6:54 AM    **SHARE this note so that the co-signing nurse is able to place an eSignature**    Nurse 2 eSignature: Electronically signed by Belkis Salazar RN on 9/24/18 at 7:24 AM

## 2018-09-24 NOTE — PROGRESS NOTES
Xr Chest Portable    Result Date: 9/10/2018  EXAMINATION: SINGLE XRAY VIEW OF THE CHEST 9/10/2018 10:39 am COMPARISON: None. HISTORY: ORDERING PHYSICIAN PROVIDED HISTORY: SOB TECHNOLOGIST PROVIDED HISTORY: Technologist Provided Reason for Exam: Shortness of Breath (pt getting ready to go to oncologists for chemo treatment today, had increasing shortness of breath on his 4 liter O2 NC. possible altered mental status with low O2 sat per EMS. patient transported from home by [employer] from home.) Acuity: Unknown Type of Encounter: Unknown FINDINGS: Right IJ port catheter tip projects over the SVC. Left subclavian approach dual lead pacemaker is unchanged. Sternotomy wires are present. Cardiac mediastinal contours are unchanged. The lungs are hypoinflated. There are ill-defined opacities in the right mid and lower lung zones, increased from the previous examination. Left hemidiaphragm is elevated with adjacent atelectasis. There is no pneumothorax. Patchy right mid lower lung zone, concerning for pneumonia. Elevation of left hemidiaphragm with adjacent airspace disease, favored to represent atelectasis. This appears similar to the previous examination.        Problem List  Patient Active Problem List   Diagnosis    Arthritis    Anxiety    Abnormal EKG    PVD (peripheral vascular disease) ([facility] [address]    Nonrheumatic aortic valve stenosis    Anemia    S/P AVR    Multiple myeloma    Coronary artery disease involving native coronary artery of native heart without angina pectoris    SOB (shortness of breath)    CKD (chronic kidney disease) stage 3, GFR 30-59 ml/min    Essential hypertension    Anemia of chronic renal failure, stage 3 (moderate)    Acute pancreatitis    H/O malignant neoplasm of skin    Basal cell papilloma    Malignant neoplasm of skin    Actinic keratosis    Upper GI bleed    Shock circulatory (HCC)    Acute post-hemorrhagic anemia    Hypercholesteremia    Thrombocytopenia (HCC)    Symptomatic bradycardia    Mobitz (type) I (Wenckebach's) atrioventricular block    Morbid obesity (HCC)    Chronic diastolic heart failure (HCC)    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    Complete AV block (HCC)    Pacemaker    Multiple myeloma not having achieved remission (HCC)    Type 2 diabetes mellitus with stage 3 chronic kidney disease, without long-term current use of insulin (HCC)    COPD exacerbation (HCC)    MDS (myelodysplastic syndrome) (HCC)    Pneumonia due to organism    Acute respiratory failure with hypoxia (HCC)    Abnormal CXR    Acute pulmonary edema (HCC)    Pneumonia of both lungs due to methicillin susceptible Staphylococcus aureus (MSSA) (HCC)    Sepsis (HCC)    Fever and chills    History of Clostridium difficile colitis       ASSESSMENT AND PLAN    Myeloma   End stage  Trnasfuse if needed for symptoms   plts under 10 or reds if symptomatic  If plans for hospcie I expect no further transfusions     Mckinley Lucas MD

## 2018-09-24 NOTE — DISCHARGE SUMMARY
Hospital Medicine Discharge Summary    Patient ID: Antonia Olivares      Patient's PCP: Halina Diaz MD    Admit Date: 9/23/2018     Discharge Date:   9/24/2018    Admitting Physician: Shirin Calloway DO     Discharge Physician: Darrell Hess MD     Discharge Diagnoses: Active Hospital Problems    Diagnosis Date Noted    Chronic diastolic heart failure (HCC) [I50.32]      Priority: High    Sepsis ([facility] [address] [A41.9]     Fever and chills [R50.9]     History of Clostridium difficile colitis [Z86.19]     MDS (myelodysplastic syndrome) ([facility] [address] [D46.9] 08/09/2018    Pacemaker [Z95.0] 05/03/2017    Thrombocytopenia ([facility] [address] [D69.6] 01/18/2017    CKD (chronic kidney disease) stage 3, GFR 30-59 ml/min [N18.3]     Multiple myeloma [C90.00] 05/01/2013    Anemia [D64.9] 12/12/2011       The patient was seen and examined on day of discharge and this discharge summary is in conjunction with any daily progress note from day of discharge. Hospital Course: 79yo man with Hx of MDS and end stage MM, chronic hypox RF, MSSA PNA and Cdiff, recently admitted to [facility], presented with hypoxia increased O2 requirement and fever. MM - deemed end stage process per HemOnc team with no plans for additional chemo. Platelets 15, Hgb 6.9 this am.   Transfused 1 unit pRBC for comfort and symptom control. No repeat transfusions planned. Pt and family would like to focus on comfort measures at this time. Pt will be discharge to  hospice today. Symptomatic Anemia - due to MDS and MM. No active bleed. Transfused 1 unit pRBC today. PNA - HCAP - high risk for MDR infection and immunocompromised state. No additional Abx desired per Pt and family. Back Pain - will treat with PRN PO morphine for comfort. Anxiety - chronic on xanax - will cont PRN for comfort.          Physical Exam Performed:     BP (!) 128/59   Pulse 91   Temp 98 °F (36.7 °C) (Axillary)   Resp 22   Ht 5' 6\" (1.676 HEM/ONC  IP CONSULT TO PULMONOLOGY  PHARMACY TO DOSE VANCOMYCIN  IP CONSULT TO INFECTIOUS DISEASES  IP CONSULT TO HOSPICE    Disposition:  IP hospice. Condition at Discharge: Stable Terminal.    Code Status:  DNR-CC     Activity: activity as tolerated    Diet: regular diet      Discharge Medications:     Current Discharge Medication List           Details   morphine 10 MG/5ML solution Take 2.5 mLs by mouth every 4 hours as needed for Pain for up to 5 days. Melanee Deiters: 1 Bottle, Refills: 0    Associated Diagnoses: Comfort measures only status              Details   ALPRAZolam (XANAX) 1 MG tablet Take 1 tablet by mouth 3 times daily as needed for Sleep for up to 5 days. Melanee Deiters: 15 tablet, Refills: 0    Associated Diagnoses: Comfort measures only status              Details   phenylephrine-mineral oil-petrolatum (PREPARATION H) 0.25-14-74.9 % rectal ointment Place rectally 2 times daily as needed for Hemorrhoids      acetaminophen (TYLENOL) 500 MG tablet Take 1,000 mg by mouth every 6 hours as needed for Pain      ONE TOUCH ULTRA TEST strip TEST AS DIRECTED TWICE DAILY  Qty: 200 strip, Refills: 3      olopatadine (PATANOL) 0.1 % ophthalmic solution INSTILL 1 DROP IN BOTH EYES TWICE DAILY  Qty: 3 Bottle, Refills: 3    Associated Diagnoses: Allergic conjunctivitis, bilateral      promethazine (PHENERGAN) 25 MG tablet Take 0.5-1 tablets by mouth every 6 hours as needed for Nausea  Qty: 30 tablet, Refills: 0      polyethylene glycol (GLYCOLAX) powder Take 17 g by mouth daily as needed       guaiFENesin 400 MG tablet Take 400 mg by mouth 2 times daily      fluticasone (FLONASE) 50 MCG/ACT nasal spray 2 sprays by Nasal route daily  Qty: 1 Bottle, Refills: 3      OXYGEN Inhale into the lungs 3 liters continously             Time Spent on discharge is more than 30 minutes in the examination, evaluation, counseling and review of medications and discharge plan.       Signed:    Michelle Stanley MD   9/24/2018      Thank you

## 2018-09-24 NOTE — PROGRESS NOTES
09/24/18   0415  09/24/18   0502   WBC  10.1  8.0   --    HGB  8.4*  6.9*  6.8*   HCT  26.1*  21.4*  21.0*   MCV  88.7  88.3   --    PLT  22*  15*   --      CHEMISTRY: Recent Labs      09/23/18   0056  09/24/18   0415   NA  136  134*   K  4.2  3.9   CL  88*  92*   CO2  33*  28   BUN  34*  41*   CREATININE  1.9*  2.2*   PHOS   --   4.6   GLUCOSE  123*  82     LIVER PROFILE:   Recent Labs      09/23/18   0056   AST  22   ALT  14   BILITOT  1.0   ALKPHOS  59       CHEST CT SCAN 9/14/18:  Mediastinum: Multiple vascular calcifications are noted.  Multiple small   lymph nodes in the mediastinum and hilar regions are noted.  There is no   pleural effusion. Cara Lipschutz is no pericardial effusion.       Lungs/pleura: Multifocal interstitial prominence is noted.  Multifocal air   bronchograms are noted with bronchiectatic changes.  Patchy ground-glass   attenuation is noted in the mid to lower lung zones bilaterally.       Upper Abdomen: Gallbladder wall thickening is suspected.  Large cyst projects   posteriorly off the upper pole of the right kidney.  Stent is noted in the   aorta.       Soft Tissues/Bones: Degenerative changes throughout the spine are noted.

## 2018-09-25 LAB
EKG ATRIAL RATE: 100 BPM
EKG DIAGNOSIS: NORMAL
EKG P AXIS: 3 DEGREES
EKG P-R INTERVAL: 210 MS
EKG Q-T INTERVAL: 320 MS
EKG QRS DURATION: 76 MS
EKG QTC CALCULATION (BAZETT): 412 MS
EKG R AXIS: 12 DEGREES
EKG T AXIS: 109 DEGREES
EKG VENTRICULAR RATE: 100 BPM
HCT VFR BLD CALC: 21.4 % (ref 40.5–52.5)
HEMATOLOGY PATH CONSULT: NO
HEMOGLOBIN: 6.9 G/DL (ref 13.5–17.5)
MCH RBC QN AUTO: 28.5 PG (ref 26–34)
MCHC RBC AUTO-ENTMCNC: 32.3 G/DL (ref 31–36)
MCV RBC AUTO: 88.3 FL (ref 80–100)
PDW BLD-RTO: 21.7 % (ref 12.4–15.4)
PLATELET # BLD: 15 K/UL (ref 135–450)
PMV BLD AUTO: 8.7 FL (ref 5–10.5)
RBC # BLD: 2.42 M/UL (ref 4.2–5.9)
WBC # BLD: 8 K/UL (ref 4–11)

## 2018-09-26 LAB
CULTURE, RESPIRATORY: ABNORMAL
CULTURE, RESPIRATORY: ABNORMAL
GRAM STAIN RESULT: ABNORMAL
ORGANISM: ABNORMAL

## 2018-09-27 LAB
CMV DNA QNT PCR: <2.6 LOG CPY/ML
CMV DNA QUANTATATIVE INTERPRETATION: <2.4 LOG IU/ML
CMV DNA QUANTATATIVE INTERPRETATION: NOT DETECTED
CMV DNA QUANTITATIVE: <227 IU/ML
CMV SOURCE: NORMAL
CMVQ COPY/ML: <390 CPY/ML

## 2018-09-28 ENCOUNTER — TELEPHONE (OUTPATIENT)
Dept: FAMILY MEDICINE CLINIC | Age: 83
End: 2018-09-28

## 2018-09-28 LAB
BLOOD CULTURE, ROUTINE: NORMAL
BLOOD CULTURE, ROUTINE: NORMAL
CULTURE, BLOOD 2: NORMAL
CULTURE, BLOOD 2: NORMAL